# Patient Record
Sex: FEMALE | Race: OTHER | NOT HISPANIC OR LATINO | URBAN - METROPOLITAN AREA
[De-identification: names, ages, dates, MRNs, and addresses within clinical notes are randomized per-mention and may not be internally consistent; named-entity substitution may affect disease eponyms.]

---

## 2018-06-12 ENCOUNTER — EMERGENCY (EMERGENCY)
Facility: HOSPITAL | Age: 28
LOS: 0 days | Discharge: HOME | End: 2018-06-13
Attending: EMERGENCY MEDICINE | Admitting: EMERGENCY MEDICINE
Payer: COMMERCIAL

## 2018-06-12 VITALS
TEMPERATURE: 98 F | HEIGHT: 65 IN | RESPIRATION RATE: 18 BRPM | OXYGEN SATURATION: 100 % | WEIGHT: 179.9 LBS | SYSTOLIC BLOOD PRESSURE: 117 MMHG | HEART RATE: 100 BPM | DIASTOLIC BLOOD PRESSURE: 70 MMHG

## 2018-06-12 PROCEDURE — 29515 APPLICATION SHORT LEG SPLINT: CPT | Mod: LT

## 2018-06-12 PROCEDURE — 73610 X-RAY EXAM OF ANKLE: CPT | Mod: 26,LT

## 2018-06-12 PROCEDURE — 73590 X-RAY EXAM OF LOWER LEG: CPT | Mod: 26,LT

## 2018-06-12 PROCEDURE — 99284 EMERGENCY DEPT VISIT MOD MDM: CPT | Mod: 25

## 2018-06-12 RX ORDER — IBUPROFEN 200 MG
600 TABLET ORAL ONCE
Qty: 0 | Refills: 0 | Status: COMPLETED | OUTPATIENT
Start: 2018-06-12 | End: 2018-06-12

## 2018-06-12 RX ADMIN — Medication 600 MILLIGRAM(S): at 23:14

## 2018-06-12 NOTE — ED ADULT NURSE NOTE - OBJECTIVE STATEMENT
lt ankle pain s/p jump ped and twisted 27 y/o female awake alert and oriented x4 presents to ED c/o left ankle injury from playing basketball. Pt states she jumped and landed with twisted ankle. Pt heard cracking sound. hx of ankle sprains b/l. Denies head injury or any other complaints. (+) pedal pulses, swelling, tenderness.

## 2018-06-12 NOTE — ED PROVIDER NOTE - OBJECTIVE STATEMENT
27 y/o female in ED c/o left ankle pain x 1 hr s/p inversion injury while playing basketball tonight.  no meds taken for pain.  increase pain with ambulation.  pt denies any other complaints
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
## 2018-06-14 DIAGNOSIS — S89.82XA OTHER SPECIFIED INJURIES OF LEFT LOWER LEG, INITIAL ENCOUNTER: ICD-10-CM

## 2018-06-14 DIAGNOSIS — S93.402A SPRAIN OF UNSPECIFIED LIGAMENT OF LEFT ANKLE, INITIAL ENCOUNTER: ICD-10-CM

## 2019-05-04 ENCOUNTER — EMERGENCY (EMERGENCY)
Facility: HOSPITAL | Age: 29
LOS: 0 days | Discharge: HOME | End: 2019-05-04
Attending: FAMILY MEDICINE | Admitting: FAMILY MEDICINE
Payer: MEDICAID

## 2019-05-04 VITALS
HEART RATE: 83 BPM | OXYGEN SATURATION: 100 % | DIASTOLIC BLOOD PRESSURE: 95 MMHG | WEIGHT: 160.06 LBS | TEMPERATURE: 100 F | HEIGHT: 66 IN | RESPIRATION RATE: 24 BRPM | SYSTOLIC BLOOD PRESSURE: 114 MMHG

## 2019-05-04 VITALS
DIASTOLIC BLOOD PRESSURE: 78 MMHG | SYSTOLIC BLOOD PRESSURE: 124 MMHG | OXYGEN SATURATION: 100 % | RESPIRATION RATE: 16 BRPM | TEMPERATURE: 99 F | HEART RATE: 78 BPM
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
## 2019-05-04 DIAGNOSIS — S82.831A OTHER FRACTURE OF UPPER AND LOWER END OF RIGHT FIBULA, INITIAL ENCOUNTER FOR CLOSED FRACTURE: ICD-10-CM
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
## 2019-05-04 LAB
ABO RH CONFIRMATION: SIGNIFICANT CHANGE UP
ALBUMIN SERPL ELPH-MCNC: 3.7 G/DL — SIGNIFICANT CHANGE UP (ref 3.3–5)
ALP SERPL-CCNC: 112 U/L — SIGNIFICANT CHANGE UP (ref 40–120)
ALT FLD-CCNC: 22 U/L — SIGNIFICANT CHANGE UP (ref 12–78)
ANION GAP SERPL CALC-SCNC: 5 MMOL/L — SIGNIFICANT CHANGE UP (ref 5–17)
APTT BLD: 26 SEC — LOW (ref 27.5–36.3)
AST SERPL-CCNC: 16 U/L — SIGNIFICANT CHANGE UP (ref 15–37)
BASOPHILS # BLD AUTO: 0.08 K/UL — SIGNIFICANT CHANGE UP (ref 0–0.2)
BASOPHILS NFR BLD AUTO: 0.7 % — SIGNIFICANT CHANGE UP (ref 0–2)
BILIRUB SERPL-MCNC: 0.2 MG/DL — SIGNIFICANT CHANGE UP (ref 0.2–1.2)
BLD GP AB SCN SERPL QL: SIGNIFICANT CHANGE UP
BUN SERPL-MCNC: 20 MG/DL — SIGNIFICANT CHANGE UP (ref 7–23)
CALCIUM SERPL-MCNC: 8.8 MG/DL — SIGNIFICANT CHANGE UP (ref 8.5–10.1)
CHLORIDE SERPL-SCNC: 107 MMOL/L — SIGNIFICANT CHANGE UP (ref 96–108)
CO2 SERPL-SCNC: 30 MMOL/L — SIGNIFICANT CHANGE UP (ref 22–31)
CREAT SERPL-MCNC: 1.21 MG/DL — SIGNIFICANT CHANGE UP (ref 0.5–1.3)
EOSINOPHIL # BLD AUTO: 0.3 K/UL — SIGNIFICANT CHANGE UP (ref 0–0.5)
EOSINOPHIL NFR BLD AUTO: 2.7 % — SIGNIFICANT CHANGE UP (ref 0–6)
GLUCOSE SERPL-MCNC: 93 MG/DL — SIGNIFICANT CHANGE UP (ref 70–99)
HCT VFR BLD CALC: 40.3 % — SIGNIFICANT CHANGE UP (ref 34.5–45)
HGB BLD-MCNC: 13.5 G/DL — SIGNIFICANT CHANGE UP (ref 11.5–15.5)
IMM GRANULOCYTES NFR BLD AUTO: 0.4 % — SIGNIFICANT CHANGE UP (ref 0–1.5)
INR BLD: 0.97 RATIO — SIGNIFICANT CHANGE UP (ref 0.88–1.16)
LYMPHOCYTES # BLD AUTO: 2.76 K/UL — SIGNIFICANT CHANGE UP (ref 1–3.3)
LYMPHOCYTES # BLD AUTO: 24.5 % — SIGNIFICANT CHANGE UP (ref 13–44)
MCHC RBC-ENTMCNC: 30.5 PG — SIGNIFICANT CHANGE UP (ref 27–34)
MCHC RBC-ENTMCNC: 33.5 GM/DL — SIGNIFICANT CHANGE UP (ref 32–36)
MCV RBC AUTO: 91.2 FL — SIGNIFICANT CHANGE UP (ref 80–100)
MONOCYTES # BLD AUTO: 0.68 K/UL — SIGNIFICANT CHANGE UP (ref 0–0.9)
MONOCYTES NFR BLD AUTO: 6 % — SIGNIFICANT CHANGE UP (ref 2–14)
NEUTROPHILS # BLD AUTO: 7.39 K/UL — SIGNIFICANT CHANGE UP (ref 1.8–7.4)
NEUTROPHILS NFR BLD AUTO: 65.7 % — SIGNIFICANT CHANGE UP (ref 43–77)
NRBC # BLD: 0 /100 WBCS — SIGNIFICANT CHANGE UP (ref 0–0)
PLATELET # BLD AUTO: 297 K/UL — SIGNIFICANT CHANGE UP (ref 150–400)
POTASSIUM SERPL-MCNC: 3.8 MMOL/L — SIGNIFICANT CHANGE UP (ref 3.5–5.3)
POTASSIUM SERPL-SCNC: 3.8 MMOL/L — SIGNIFICANT CHANGE UP (ref 3.5–5.3)
PROT SERPL-MCNC: 7.1 GM/DL — SIGNIFICANT CHANGE UP (ref 6–8.3)
PROTHROM AB SERPL-ACNC: 10.7 SEC — SIGNIFICANT CHANGE UP (ref 10–12.9)
RBC # BLD: 4.42 M/UL — SIGNIFICANT CHANGE UP (ref 3.8–5.2)
RBC # FLD: 12.5 % — SIGNIFICANT CHANGE UP (ref 10.3–14.5)
SODIUM SERPL-SCNC: 142 MMOL/L — SIGNIFICANT CHANGE UP (ref 135–145)
TYPE + AB SCN PNL BLD: SIGNIFICANT CHANGE UP
WBC # BLD: 11.25 K/UL — HIGH (ref 3.8–10.5)
WBC # FLD AUTO: 11.25 K/UL — HIGH (ref 3.8–10.5)

## 2019-05-04 PROCEDURE — 73600 X-RAY EXAM OF ANKLE: CPT | Mod: 26,RT,76

## 2019-05-04 PROCEDURE — 73700 CT LOWER EXTREMITY W/O DYE: CPT | Mod: 26,RT

## 2019-05-04 PROCEDURE — 27788 TREATMENT OF ANKLE FRACTURE: CPT | Mod: 54,RT

## 2019-05-04 PROCEDURE — 76376 3D RENDER W/INTRP POSTPROCES: CPT | Mod: 26

## 2019-05-04 PROCEDURE — 99285 EMERGENCY DEPT VISIT HI MDM: CPT

## 2019-05-04 PROCEDURE — 73590 X-RAY EXAM OF LOWER LEG: CPT | Mod: 26,RT

## 2019-05-04 PROCEDURE — 73630 X-RAY EXAM OF FOOT: CPT | Mod: 26,RT

## 2019-05-04 RX ORDER — IBUPROFEN 200 MG
1 TABLET ORAL
Qty: 30 | Refills: 0
Start: 2019-05-04 | End: 2019-05-13

## 2019-05-04 RX ORDER — HYDROMORPHONE HYDROCHLORIDE 2 MG/ML
1 INJECTION INTRAMUSCULAR; INTRAVENOUS; SUBCUTANEOUS ONCE
Qty: 0 | Refills: 0 | Status: DISCONTINUED | OUTPATIENT
Start: 2019-05-04 | End: 2019-05-04

## 2019-05-04 RX ORDER — SODIUM CHLORIDE 9 MG/ML
1000 INJECTION INTRAMUSCULAR; INTRAVENOUS; SUBCUTANEOUS ONCE
Qty: 0 | Refills: 0 | Status: COMPLETED | OUTPATIENT
Start: 2019-05-04 | End: 2019-05-04

## 2019-05-04 RX ADMIN — HYDROMORPHONE HYDROCHLORIDE 1 MILLIGRAM(S): 2 INJECTION INTRAMUSCULAR; INTRAVENOUS; SUBCUTANEOUS at 17:39

## 2019-05-04 RX ADMIN — SODIUM CHLORIDE 1000 MILLILITER(S): 9 INJECTION INTRAMUSCULAR; INTRAVENOUS; SUBCUTANEOUS at 17:31

## 2019-05-04 RX ADMIN — HYDROMORPHONE HYDROCHLORIDE 1 MILLIGRAM(S): 2 INJECTION INTRAMUSCULAR; INTRAVENOUS; SUBCUTANEOUS at 17:06

## 2019-05-04 NOTE — ED ADULT NURSE NOTE - NSIMPLEMENTINTERV_GEN_ALL_ED
Implemented All Fall Risk Interventions:  [place] to call system. Call bell, personal items and telephone within reach. Instruct patient to call for assistance. Room bathroom lighting operational. Non-slip footwear when patient is off stretcher. Physically safe environment: no spills, clutter or unnecessary equipment. Stretcher in lowest position, wheels locked, appropriate side rails in place. Provide visual cue, wrist band, yellow gown, etc. Monitor gait and stability. Monitor for mental status changes and reorient to person, place, and time. Review medications for side effects contributing to fall risk. Reinforce activity limits and safety measures with patient and family.

## 2019-05-04 NOTE — ED PROVIDER NOTE - PROGRESS NOTE DETAILS
Kai CAMPBELL for attending Dr. Cade: spoke with Dr. Austin of orthopedics regarding pt's condition. Scribe AD for attending Dr. Cade: call placed to Dr. Austin to ask regarding dispo. States Dr. Mercedes will be taking over the case and will give him a call.

## 2019-05-04 NOTE — ED PROVIDER NOTE - CARE PLAN
Principal Discharge DX:	Ankle dislocation, right, initial encounter  Secondary Diagnosis:	Ankle fracture

## 2019-05-04 NOTE — ED ADULT NURSE NOTE - OBJECTIVE STATEMENT
Pt was on a skateboard fell off and landed on right ankle, obvious deformity. Pt has positive pedal pulse, warm to touch with < 2 capillary refill.

## 2019-05-04 NOTE — CONSULT NOTE ADULT - SUBJECTIVE AND OBJECTIVE BOX
28y Female community ambulatory presents c/o R ankle pain sp mechanical fall while longboarding. Denies HS/LOC. Denies numbness/tingling. No other pain/injuries. Denies fevers/chills.     HEALTH ISSUES - PROBLEM Dx:  depression  asthma      MEDICATIONS  (STANDING):    Allergies    No Known Drug Allergies  Originally Entered as [Mild Rash/hives] reaction to [trix cereal] (Unknown)    Intolerances                              13.5   11.25 )-----------( 297      ( 04 May 2019 17:32 )             40.3     04 May 2019 17:32    142    |  107    |  20     ----------------------------<  93     3.8     |  30     |  1.21     Ca    8.8        04 May 2019 17:32    TPro  7.1    /  Alb  3.7    /  TBili  0.2    /  DBili  x      /  AST  16     /  ALT  22     /  AlkPhos  112    04 May 2019 17:32    PT/INR - ( 04 May 2019 17:32 )   PT: 10.7 sec;   INR: 0.97 ratio         PTT - ( 04 May 2019 17:32 )  PTT:26.0 sec  Vital Signs Last 24 Hrs  T(C): 37.7 (05-04-19 @ 16:46), Max: 37.7 (05-04-19 @ 16:46)  T(F): 99.8 (05-04-19 @ 16:46), Max: 99.8 (05-04-19 @ 16:46)  HR: 76 (05-04-19 @ 17:38) (76 - 83)  BP: 138/77 (05-04-19 @ 17:38) (114/95 - 138/77)  BP(mean): --  RR: 20 (05-04-19 @ 17:38) (20 - 24)  SpO2: 99% (05-04-19 @ 17:38) (99% - 100%)    Imaging: XR demonstrates R ankle fracture/dislocation, CT RLE obtained    Physical Exam  Gen: Nad  R LE: Skin intact, +deformity ankle, +TTP medial malleolus and lateral fibula, no bony ttp elsewhere, +ehl/fhl/ta/gs function, dp/pt pulse intact, negative log roll, able to SLR, compartments soft/compressive, extremity warm/well perfused  Secondary Survey: Benign, no bony ttp elsewhere, NV intact    Procedure: 10 cc 1% lidocaine injected under sterile procedure into  R ankle joint. Closed reduction performed. Placed in well padded trilam splint. Post procedure imaging obtained. Post procedure exam unchanged, NV intact, able to move all toes, SILT distally.     A/P: 28y Female with R ankle fracture/dislocation s/p reduction    Pain control  NWB R LE in splint, keep c/d/I, cane/crutches/walker as needed  Ice/elevation  CT RLE reviewed  Si/sx compartment syndrome discussed with patient, told to return to ED if exhibit any  Need for surgical intervention in future discussed, all questions answered  Follow up with Dr. Mercedes on 5/6/19, call office for appointment   Ortho stable for DC

## 2019-05-04 NOTE — ED ADULT TRIAGE NOTE - CHIEF COMPLAINT QUOTE
Pt presents to ED s/p fall on skateboard obtaining obvious right ankle deformity.  Denies hitting head, LOC or blood thinners.  100 micrograms fentanyl given PTA along with 4mg Zofran.

## 2019-05-04 NOTE — ED PROVIDER NOTE - OBJECTIVE STATEMENT
29 y/o female with a PMHx of depression on Wellbutrin and Trintellix presents to the ED BIBA s/p fall off skateboard c/o right ankle pain with obvious deformity. Pt reports she fell and heard a crack as she landed on her right ankle. Denies head injury, LOC, fever. Not on any blood thinners. Pt was given 100 micrograms fentanyl given PTA along with 4mg Zofran. Pt drank water PTA and last ate a burger around 11:30AM today with some snacks in the afternoon.

## 2019-05-04 NOTE — ED PROVIDER NOTE - CLINICAL SUMMARY MEDICAL DECISION MAKING FREE TEXT BOX
Pt s/p fall off skateboard wit right ankle deformity. Will give Dilaudid for pain, X-Ray's of right ankle, labs, consult ortho.

## 2019-05-04 NOTE — ED PROVIDER NOTE - MUSCULOSKELETAL, MLM
Spine appears normal, range of motion is not limited, +right ankle deformed and tenting, good pulses and sensation intact

## 2019-05-06 ENCOUNTER — APPOINTMENT (OUTPATIENT)
Dept: ORTHOPEDIC SURGERY | Facility: CLINIC | Age: 29
End: 2019-05-06
Payer: MEDICAID

## 2019-05-06 VITALS
HEART RATE: 91 BPM | HEIGHT: 65 IN | BODY MASS INDEX: 36.65 KG/M2 | SYSTOLIC BLOOD PRESSURE: 102 MMHG | WEIGHT: 220 LBS | DIASTOLIC BLOOD PRESSURE: 67 MMHG

## 2019-05-06 DIAGNOSIS — Z78.9 OTHER SPECIFIED HEALTH STATUS: ICD-10-CM

## 2019-05-06 DIAGNOSIS — Z87.09 PERSONAL HISTORY OF OTHER DISEASES OF THE RESPIRATORY SYSTEM: ICD-10-CM

## 2019-05-06 DIAGNOSIS — Z82.61 FAMILY HISTORY OF ARTHRITIS: ICD-10-CM

## 2019-05-06 PROBLEM — F32.9 MAJOR DEPRESSIVE DISORDER, SINGLE EPISODE, UNSPECIFIED: Chronic | Status: ACTIVE | Noted: 2019-05-04

## 2019-05-06 PROCEDURE — 99204 OFFICE O/P NEW MOD 45 MIN: CPT

## 2019-05-06 RX ORDER — BUPROPION HYDROCHLORIDE 150 MG/1
150 TABLET, EXTENDED RELEASE ORAL
Qty: 30 | Refills: 0 | Status: ACTIVE | COMMUNITY
Start: 2019-02-19

## 2019-05-06 RX ORDER — VORTIOXETINE 10 MG/1
10 TABLET, FILM COATED ORAL
Qty: 30 | Refills: 0 | Status: ACTIVE | COMMUNITY
Start: 2019-03-30

## 2019-05-06 NOTE — CONSULT LETTER
[Consult Letter:] : I had the pleasure of evaluating your patient, [unfilled]. [Please see my note below.] : Please see my note below. [Consult Closing:] : Thank you very much for allowing me to participate in the care of this patient.  If you have any questions, please do not hesitate to contact me. [Sincerely,] : Sincerely, [FreeTextEntry3] : Raffy Mercedes

## 2019-05-06 NOTE — DISCUSSION/SUMMARY
[de-identified] : Today I had a lengthy discussion with the patient regarding their right fibula pain.I have addressed all the patient's concerns surrounding the pathology of their condition. A discussion was had about surgery. A lengthy discussion was had about the surgery. All risks, benefits and alternatives to the recommended surgical procedure were discussed which include but are not limited to bleeding, infection, nerve damage, vascular damage, failure of the wound to heal, the need for further surgery, loss of limb, DVT, PE, loss of life as well as the risks associated with general anesthesia. The patient verbalized understanding and provided informed consent to move forward with surgery. I informed the patient that she would have to be non weight bearing following the surgery. I also informed the patient about PO care and rehabilitation. The patient understood and verbally agreed to the treatment plan. All of their questions were answered and they were satisfied with the visit. The patient should call the office if they have any questions or experience worsening symptoms. \par

## 2019-05-06 NOTE — HISTORY OF PRESENT ILLNESS
[FreeTextEntry1] : 28 year year old female presenting with right fibula pain. The patient’s pain is noted to be a 5/10. The patient's pain began on 5/4/19 when she had a fall while riding her long board. She states that she heard multiple cracks during the fall. The patient went to the ER after the fall where she was told she had a dislocation and a fracture. She was put into a splint at the ER. The patient's pain is described as throbbing and constant in nature. She is currently taking NSAIDs. The patient presents today in a Splint. The patient is accompanied by her mother and father. No other complaints at this time.

## 2019-05-06 NOTE — PHYSICAL EXAM
[de-identified] : General: Alert and oriented x3. In no acute distress. Pleasant in nature with a normal affect. No apparent respiratory distress.\par \par R Ankle Exam \par Limited Exam. Splint in Place. \par Skin:Could not assess\par Inspection: Could not assess. \par Pulses: 2+ DP/PT pulses\par ROM: RIGHT Could not assess degrees of dorsiflexion, Could not assess degrees of plantarflexion, Could not assess degrees of subtalar motion\par Tenderness: Could not assess.\par Strength: Could not assess\par Neuro: In tact to light touch throughout\par  [de-identified] : CT Scan from [facility] of RLE of 5/4/19 and reviewed by me today, 5/6/19, revealed: \par \par Acute obliquely oriented fracture of the distal third of the \par fibular diaphysis with posterior displacement of the dominant distal \par fracture fragment.

## 2019-05-06 NOTE — ADDENDUM
[FreeTextEntry1] : I, Xander Dee, acted solely as a scribe for Dr. Raffy Mercedes on this date 05/06/2019  .\par  \par All medical record entries made by the Scribe were at my, Dr. Raffy Mercedes, direction and personally dictated by me on 05/06/2019 . I have reviewed the chart and agree that the record accurately reflects my personal performance of the history, physical exam, assessment and plan. I have also personally directed, reviewed, and agreed with the chart.\par \par \par

## 2019-05-23 ENCOUNTER — APPOINTMENT (OUTPATIENT)
Dept: ORTHOPEDIC SURGERY | Facility: HOSPITAL | Age: 29
End: 2019-05-23

## 2019-05-23 ENCOUNTER — INPATIENT (INPATIENT)
Facility: HOSPITAL | Age: 29
LOS: 0 days | Discharge: HOME | End: 2019-05-23
Attending: ORTHOPAEDIC SURGERY | Admitting: ORTHOPAEDIC SURGERY
Payer: MEDICAID

## 2019-05-23 VITALS
HEART RATE: 80 BPM | DIASTOLIC BLOOD PRESSURE: 53 MMHG | OXYGEN SATURATION: 98 % | SYSTOLIC BLOOD PRESSURE: 121 MMHG | RESPIRATION RATE: 14 BRPM

## 2019-05-23 VITALS — HEIGHT: 65 IN | WEIGHT: 220.02 LBS

## 2019-05-23 DIAGNOSIS — S82.891D OTHER FRACTURE OF RIGHT LOWER LEG, SUBSEQUENT ENCOUNTER FOR CLOSED FRACTURE WITH ROUTINE HEALING: ICD-10-CM

## 2019-05-23 LAB
ALBUMIN SERPL ELPH-MCNC: 3.5 G/DL — SIGNIFICANT CHANGE UP (ref 3.3–5)
ALP SERPL-CCNC: 100 U/L — SIGNIFICANT CHANGE UP (ref 40–120)
ALT FLD-CCNC: 20 U/L — SIGNIFICANT CHANGE UP (ref 12–78)
ANION GAP SERPL CALC-SCNC: 6 MMOL/L — SIGNIFICANT CHANGE UP (ref 5–17)
APTT BLD: 30.5 SEC — SIGNIFICANT CHANGE UP (ref 27.5–36.3)
AST SERPL-CCNC: 14 U/L — LOW (ref 15–37)
BASOPHILS # BLD AUTO: 0.05 K/UL — SIGNIFICANT CHANGE UP (ref 0–0.2)
BASOPHILS NFR BLD AUTO: 0.8 % — SIGNIFICANT CHANGE UP (ref 0–2)
BILIRUB SERPL-MCNC: 0.5 MG/DL — SIGNIFICANT CHANGE UP (ref 0.2–1.2)
BLD GP AB SCN SERPL QL: SIGNIFICANT CHANGE UP
BUN SERPL-MCNC: 15 MG/DL — SIGNIFICANT CHANGE UP (ref 7–23)
CALCIUM SERPL-MCNC: 9 MG/DL — SIGNIFICANT CHANGE UP (ref 8.5–10.1)
CHLORIDE SERPL-SCNC: 107 MMOL/L — SIGNIFICANT CHANGE UP (ref 96–108)
CO2 SERPL-SCNC: 26 MMOL/L — SIGNIFICANT CHANGE UP (ref 22–31)
CREAT SERPL-MCNC: 0.92 MG/DL — SIGNIFICANT CHANGE UP (ref 0.5–1.3)
EOSINOPHIL # BLD AUTO: 0.27 K/UL — SIGNIFICANT CHANGE UP (ref 0–0.5)
EOSINOPHIL NFR BLD AUTO: 4.1 % — SIGNIFICANT CHANGE UP (ref 0–6)
GLUCOSE SERPL-MCNC: 82 MG/DL — SIGNIFICANT CHANGE UP (ref 70–99)
HCG SERPL-ACNC: <1 MIU/ML — SIGNIFICANT CHANGE UP
HCT VFR BLD CALC: 40 % — SIGNIFICANT CHANGE UP (ref 34.5–45)
HGB BLD-MCNC: 13.3 G/DL — SIGNIFICANT CHANGE UP (ref 11.5–15.5)
IMM GRANULOCYTES NFR BLD AUTO: 0.2 % — SIGNIFICANT CHANGE UP (ref 0–1.5)
INR BLD: 1.04 RATIO — SIGNIFICANT CHANGE UP (ref 0.88–1.16)
LYMPHOCYTES # BLD AUTO: 2.19 K/UL — SIGNIFICANT CHANGE UP (ref 1–3.3)
LYMPHOCYTES # BLD AUTO: 33.4 % — SIGNIFICANT CHANGE UP (ref 13–44)
MCHC RBC-ENTMCNC: 29.8 PG — SIGNIFICANT CHANGE UP (ref 27–34)
MCHC RBC-ENTMCNC: 33.3 GM/DL — SIGNIFICANT CHANGE UP (ref 32–36)
MCV RBC AUTO: 89.7 FL — SIGNIFICANT CHANGE UP (ref 80–100)
MONOCYTES # BLD AUTO: 0.43 K/UL — SIGNIFICANT CHANGE UP (ref 0–0.9)
MONOCYTES NFR BLD AUTO: 6.6 % — SIGNIFICANT CHANGE UP (ref 2–14)
NEUTROPHILS # BLD AUTO: 3.61 K/UL — SIGNIFICANT CHANGE UP (ref 1.8–7.4)
NEUTROPHILS NFR BLD AUTO: 54.9 % — SIGNIFICANT CHANGE UP (ref 43–77)
PLATELET # BLD AUTO: 279 K/UL — SIGNIFICANT CHANGE UP (ref 150–400)
POTASSIUM SERPL-MCNC: 3.8 MMOL/L — SIGNIFICANT CHANGE UP (ref 3.5–5.3)
POTASSIUM SERPL-SCNC: 3.8 MMOL/L — SIGNIFICANT CHANGE UP (ref 3.5–5.3)
PROT SERPL-MCNC: 7.1 GM/DL — SIGNIFICANT CHANGE UP (ref 6–8.3)
PROTHROM AB SERPL-ACNC: 11.6 SEC — SIGNIFICANT CHANGE UP (ref 10–12.9)
RBC # BLD: 4.46 M/UL — SIGNIFICANT CHANGE UP (ref 3.8–5.2)
RBC # FLD: 12.2 % — SIGNIFICANT CHANGE UP (ref 10.3–14.5)
SODIUM SERPL-SCNC: 139 MMOL/L — SIGNIFICANT CHANGE UP (ref 135–145)
TYPE + AB SCN PNL BLD: SIGNIFICANT CHANGE UP
WBC # BLD: 6.56 K/UL — SIGNIFICANT CHANGE UP (ref 3.8–10.5)
WBC # FLD AUTO: 6.56 K/UL — SIGNIFICANT CHANGE UP (ref 3.8–10.5)

## 2019-05-23 PROCEDURE — 73610 X-RAY EXAM OF ANKLE: CPT | Mod: 26,RT

## 2019-05-23 PROCEDURE — 99223 1ST HOSP IP/OBS HIGH 75: CPT | Mod: 24,57

## 2019-05-23 PROCEDURE — 27792 TREATMENT OF ANKLE FRACTURE: CPT | Mod: 58,RT

## 2019-05-23 PROCEDURE — 99221 1ST HOSP IP/OBS SF/LOW 40: CPT

## 2019-05-23 PROCEDURE — 27829 TREAT LOWER LEG JOINT: CPT | Mod: 58,RT

## 2019-05-23 PROCEDURE — 93010 ELECTROCARDIOGRAM REPORT: CPT

## 2019-05-23 PROCEDURE — 76000 FLUOROSCOPY <1 HR PHYS/QHP: CPT | Mod: 26

## 2019-05-23 PROCEDURE — 99285 EMERGENCY DEPT VISIT HI MDM: CPT

## 2019-05-23 RX ORDER — OXYCODONE HYDROCHLORIDE 5 MG/1
10 TABLET ORAL EVERY 4 HOURS
Refills: 0 | Status: DISCONTINUED | OUTPATIENT
Start: 2019-05-23 | End: 2019-05-23

## 2019-05-23 RX ORDER — OXYCODONE HYDROCHLORIDE 5 MG/1
1 TABLET ORAL
Qty: 40 | Refills: 0
Start: 2019-05-23

## 2019-05-23 RX ORDER — SODIUM CHLORIDE 9 MG/ML
1000 INJECTION, SOLUTION INTRAVENOUS
Refills: 0 | Status: DISCONTINUED | OUTPATIENT
Start: 2019-05-23 | End: 2019-05-23

## 2019-05-23 RX ORDER — OXYCODONE HYDROCHLORIDE 5 MG/1
5 TABLET ORAL EVERY 4 HOURS
Refills: 0 | Status: DISCONTINUED | OUTPATIENT
Start: 2019-05-23 | End: 2019-05-23

## 2019-05-23 RX ORDER — ASCORBIC ACID 60 MG
500 TABLET,CHEWABLE ORAL
Refills: 0 | Status: DISCONTINUED | OUTPATIENT
Start: 2019-05-23 | End: 2019-05-23

## 2019-05-23 RX ORDER — FOLIC ACID 0.8 MG
1 TABLET ORAL DAILY
Refills: 0 | Status: DISCONTINUED | OUTPATIENT
Start: 2019-05-23 | End: 2019-05-23

## 2019-05-23 RX ORDER — ONDANSETRON 8 MG/1
1 TABLET, FILM COATED ORAL
Qty: 20 | Refills: 0
Start: 2019-05-23

## 2019-05-23 RX ORDER — FENTANYL CITRATE 50 UG/ML
50 INJECTION INTRAVENOUS
Refills: 0 | Status: DISCONTINUED | OUTPATIENT
Start: 2019-05-23 | End: 2019-05-23

## 2019-05-23 RX ORDER — OXYCODONE HYDROCHLORIDE 5 MG/1
10 TABLET ORAL ONCE
Refills: 0 | Status: DISCONTINUED | OUTPATIENT
Start: 2019-05-23 | End: 2019-05-23

## 2019-05-23 RX ORDER — ONDANSETRON 8 MG/1
4 TABLET, FILM COATED ORAL ONCE
Refills: 0 | Status: DISCONTINUED | OUTPATIENT
Start: 2019-05-23 | End: 2019-05-23

## 2019-05-23 RX ORDER — ACETAMINOPHEN 500 MG
650 TABLET ORAL EVERY 6 HOURS
Refills: 0 | Status: DISCONTINUED | OUTPATIENT
Start: 2019-05-23 | End: 2019-05-23

## 2019-05-23 RX ORDER — DOCUSATE SODIUM 100 MG
1 CAPSULE ORAL
Qty: 20 | Refills: 0
Start: 2019-05-23

## 2019-05-23 RX ORDER — ASPIRIN/CALCIUM CARB/MAGNESIUM 324 MG
1 TABLET ORAL
Qty: 30 | Refills: 0
Start: 2019-05-23 | End: 2019-06-21

## 2019-05-23 RX ORDER — DOCUSATE SODIUM 100 MG
100 CAPSULE ORAL THREE TIMES A DAY
Refills: 0 | Status: DISCONTINUED | OUTPATIENT
Start: 2019-05-23 | End: 2019-05-23

## 2019-05-23 RX ADMIN — OXYCODONE HYDROCHLORIDE 10 MILLIGRAM(S): 5 TABLET ORAL at 18:29

## 2019-05-23 RX ADMIN — OXYCODONE HYDROCHLORIDE 10 MILLIGRAM(S): 5 TABLET ORAL at 18:24

## 2019-05-23 RX ADMIN — SODIUM CHLORIDE 75 MILLILITER(S): 9 INJECTION, SOLUTION INTRAVENOUS at 09:05

## 2019-05-23 RX ADMIN — SODIUM CHLORIDE 76 MILLILITER(S): 9 INJECTION, SOLUTION INTRAVENOUS at 18:29

## 2019-05-23 RX ADMIN — FENTANYL CITRATE 50 MICROGRAM(S): 50 INJECTION INTRAVENOUS at 18:24

## 2019-05-23 RX ADMIN — FENTANYL CITRATE 50 MICROGRAM(S): 50 INJECTION INTRAVENOUS at 18:29

## 2019-05-23 NOTE — ED ADULT NURSE NOTE - NSIMPLEMENTINTERV_GEN_ALL_ED
Implemented All Fall Risk Interventions:  [place] to call system. Call bell, personal items and telephone within reach. Instruct patient to call for assistance. Room bathroom lighting operational. Non-slip footwear when patient is off stretcher. Physically safe environment: no spills, clutter or unnecessary equipment. Stretcher in lowest position, wheels locked, appropriate side rails in place. Provide visual cue, wrist band, yellow gown, etc. Monitor gait and stability. Monitor for mental status changes and reorient to person, place, and time. Review medications for side effects contributing to fall risk. Reinforce activity limits and safety measures with patient and family.

## 2019-05-23 NOTE — ED ADULT TRIAGE NOTE - CHIEF COMPLAINT QUOTE
pt has Right ankle and fibula fracture since 5/4/19,leg is in a cast , c/o pain to right leg , denies new injury  , meeting Dr. Mercedes

## 2019-05-23 NOTE — BRIEF OPERATIVE NOTE - NSICDXBRIEFPROCEDURE_GEN_ALL_CORE_FT
PROCEDURES:  Open reduction and internal fixation (ORIF) of fracture of lateral malleolus of right fibula 23-May-2019 17:42:55  Radha Espinosa

## 2019-05-23 NOTE — ASU DISCHARGE PLAN (ADULT/PEDIATRIC) - CARE PROVIDER_API CALL
Raffy Mercedes (DO)  Orthopaedic Surgery  [address] [place] [address]  Phone: [phone]  Fax: [phone]  Follow Up Time:

## 2019-05-23 NOTE — ED PROVIDER NOTE - MUSCULOSKELETAL, MLM
Spine appears normal, range of motion is not limited, no muscle or joint tenderness; right ankle in splint;

## 2019-05-23 NOTE — H&P ADULT - ASSESSMENT
Assessment:  29y Female with increased right ankle pain 2' right ankle fracture.    Plan:  Admit to Orthopedics.  Pt advised that surgical fixation of right ankle fracture is necessary. Surgical procedure was discussed in detail including all risks, benefits and alternatives. Pt expresses understanding and agrees to proceed with surgery; consent obtained.  Plan for OR today.  NPO and hold chemical anticoagulation pending OR.  NWB RLE with trilam splint.  Pain control.  Ice application.  RLE elevation.   f/u imaging.    Case discussed with Dr. Mercedes who agrees with plan.

## 2019-05-23 NOTE — ED ADULT NURSE NOTE - OBJECTIVE STATEMENT
patient presents with complaint of right leg pain s/p fx of right ankle and fibula on 5/4/19. right leg currently splinted. + sensation and movement in toes. good cap refill noted. patient reports constant pain. patient last took pain medicine - IBU - 3 days ago. patient currently ambulating at home with crutches - non-weight bearing on right leg. color good. skin warm and dry. respirations even and unlabored. family at bedside.

## 2019-05-23 NOTE — H&P ADULT - ATTENDING COMMENTS
Patient seen and examined at bedside. The patient is well known to me from the office. She sustained a right distal third fibula fracture with syndesmotic disruption. The patient was indicated for surgery.All risks, benefits and alternatives to the recommended surgical procedure were discussed which include but are not limited to bleeding, infection, nerve damage, vascular damage, failure of the wound to heal, the need for further surgery, loss of limb, DVT, PE, loss of life as well as the risks associated with general anesthesia. The patient verbalized understanding and provided informed consent to move forward with surgery.All questions were answered and the patient verbalized understanding.  The patient is in agreement with this treatment plan.    Patient agreed to move forward with right ankle open reduction internal fixation with syndesmotic fixation.

## 2019-05-23 NOTE — H&P ADULT - NSHPLABSRESULTS_GEN_ALL_CORE
Labs:                        13.3   6.56  )-----------( 279      ( 23 May 2019 09:01 )             40.0   05-23    139  |  107  |  15  ----------------------------<  82  3.8   |  26  |  0.92    Ca    9.0      23 May 2019 09:01    TPro  7.1  /  Alb  3.5  /  TBili  0.5  /  DBili  x   /  AST  14<L>  /  ALT  20  /  AlkPhos  100  05-23    PT/INR - ( 23 May 2019 09:01 )   PT: 11.6 sec;   INR: 1.04 ratio         PTT - ( 23 May 2019 09:01 )  PTT:30.5 sec    Upreg neg    Imaging:  Right ankle XRs and CT from 5/4 demonstrate a right tyshawn ankle fracture/dislocation s/p closed reduction.  Repeat right ankle XR pending.

## 2019-05-23 NOTE — H&P ADULT - HISTORY OF PRESENT ILLNESS
Orthopedics    Dx: Right Ankle Fracture  Attending: Dr. Mercedes    CC: Right Ankle Pain    PAST MEDICAL & SURGICAL HISTORY:  Depression  29y    HPI: Pt is a 29y Female who presents to the ED c/o increased right ankle pain s/p right ankle fracture/dislocation on 5/4 being followed by Dr. Mercedes. Pt reports for the past day or so. Pt denies any new injury or trauma. Pt denies any numbness, tingling or paresthesias. Pt denies any fever or chills. Orthopedics    Dx: Right Ankle Fracture  Attending: Dr. Mercedes    CC: Right Ankle Pain    PAST MEDICAL & SURGICAL HISTORY:  Depression  29y    HPI: Pt is a 29y Female who presents to the ED c/o increased right ankle pain s/p right ankle fracture/dislocation on 5/4 being followed by Dr. Mercedes. Pt reports some increased pain for the past day or so. Pt denies any new injury or trauma. Pt denies any numbness, tingling or paresthesias. Pt denies any fever or chills.

## 2019-05-23 NOTE — H&P ADULT - NSHPPHYSICALEXAM_GEN_ALL_CORE
Exam:  NAD AAOx3 Well nourished  Head: NC AT, PEERL  Neck: FAROM supple  Pulse: Regular  Lungs: Breathing nonlabored  Abdomen: Soft NT  LE: No edema  Musculoskeletal:  PE right ankle:  Trilam splint in place, clean, dry and intact. Moving all toes, +EHL/FHL. SILT distally. Cap refill <2secs.

## 2019-05-29 DIAGNOSIS — S82.61XD DISPLACED FRACTURE OF LATERAL MALLEOLUS OF RIGHT FIBULA, SUBSEQUENT ENCOUNTER FOR CLOSED FRACTURE WITH ROUTINE HEALING: ICD-10-CM
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
## 2019-06-03 ENCOUNTER — APPOINTMENT (OUTPATIENT)
Dept: ORTHOPEDIC SURGERY | Facility: CLINIC | Age: 29
End: 2019-06-03
Payer: MEDICAID

## 2019-06-03 PROCEDURE — 29405 APPL SHORT LEG CAST: CPT | Mod: 58,RT

## 2019-06-03 PROCEDURE — 99024 POSTOP FOLLOW-UP VISIT: CPT

## 2019-06-03 PROCEDURE — 73610 X-RAY EXAM OF ANKLE: CPT | Mod: RT

## 2019-06-03 NOTE — HISTORY OF PRESENT ILLNESS
[___ Days Post Op] : post op day #[unfilled] [Healed] : healed [Clean/Dry/Intact] : clean, dry and intact [Neuro Intact] : an unremarkable neurological exam [Vascular Intact] : ~T peripheral vascular exam normal [Negative Marco's] : maneuvers demonstrated a negative Marco's sign [Doing Well] : is doing well [Excellent Pain Control] : has excellent pain control [Sutures Removed] : sutures were removed [No Sign of Infection] : is showing no signs of infection [Chills] : no chills [Fever] : no fever [Nausea] : no nausea [Erythema] : not erythematous [Vomiting] : no vomiting [Discharge] : absent of discharge [Swelling] : not swollen [Dehiscence] : not dehisced [de-identified] : Right ankle sx\par DOS 5/23/19 [de-identified] : 29 year year old female presenting 11 days post-op from right ankle sx.  The patient’s pain is noted to be a 4/10. The patient describes their pain as 80% improved compared to their last visit. She describes her swelling as 90% improved compared to the last visit. She  is currently taking Advil and Aspirin. She presents wearing a cast. She presents ambulating with crutches. The patient is ambulating with a wheel chair. The patient is accompanied by her father. No other complaints at this time. \par   [de-identified] : General: Alert and oriented x3. In no acute distress. Pleasant in nature with a normal affect. No apparent respiratory distress.\par  \par The wound is intact. no evidence of any diastases or dehiscence. No surrounding erythema noted. No fluctuance. The area is warm and well perfused. The patient is able to wiggle their toes. Neurovascularly intact. No medial sided pain. \par \par \par  \par   [de-identified] : 3V of the R ankle were ordered obtained and reviewed by me today, 06/03/2019 , revealed:\par Hardware in good position. Healing noted.  [de-identified] : 29 year year old female present 11 days post-op from right ankle sx. I recommend that the patient be fitted for a cast. The patient was fitted for the cast in the office today. She should be non weight bearing until further notice. If the patient notices any loosening of the cast, they should call the office as soon as possible.I advised the patient to utilize ice and elevate their RLE.  At this time, the patient should continue their aspirin regimen. The patient can utilize NSAIDs PRN. I would like to see the patient back in the office in 2-4 weeks to reassess their condition. The patient understood and verbally agreed to the treatment plan. All of their questions were answered and they were satisfied with the visit. The patient should call the office if they have any questions or experience worsening symptoms. \par

## 2019-06-03 NOTE — ADDENDUM
[FreeTextEntry1] : I, Xander Dee, acted solely as a scribe for Dr. Raffy Mercedes on this date 06/03/2019  .\par  \par All medical record entries made by the Scribe were at my, Dr. Raffy Mrecedes, direction and personally dictated by me on 06/03/2019 . I have reviewed the chart and agree that the record accurately reflects my personal performance of the history, physical exam, assessment and plan. I have also personally directed, reviewed, and agreed with the chart.\par \par \par

## 2019-06-17 ENCOUNTER — APPOINTMENT (OUTPATIENT)
Dept: ORTHOPEDIC SURGERY | Facility: CLINIC | Age: 29
End: 2019-06-17
Payer: MEDICAID

## 2019-06-17 PROCEDURE — 97760 ORTHOTIC MGMT&TRAING 1ST ENC: CPT

## 2019-06-17 PROCEDURE — 99024 POSTOP FOLLOW-UP VISIT: CPT

## 2019-06-17 PROCEDURE — 73610 X-RAY EXAM OF ANKLE: CPT | Mod: RT

## 2019-06-17 NOTE — ADDENDUM
[FreeTextEntry1] : I, Xander Dee, acted solely as a scribe for Dr. Raffy Mercedes on this date 06/17/2019  .\par  \par All medical record entries made by the Scribe were at my, Dr. Raffy Mercedes, direction and personally dictated by me on 06/17/2019 . I have reviewed the chart and agree that the record accurately reflects my personal performance of the history, physical exam, assessment and plan. I have also personally directed, reviewed, and agreed with the chart.\par \par \par

## 2019-06-17 NOTE — HISTORY OF PRESENT ILLNESS
[___ Weeks Post Op] : [unfilled] weeks post op [0] : no pain reported [Clean/Dry/Intact] : clean, dry and intact [Healed] : healed [Vascular Intact] : ~T peripheral vascular exam normal [Neuro Intact] : an unremarkable neurological exam [Doing Well] : is doing well [Excellent Pain Control] : has excellent pain control [Negative Marco's] : maneuvers demonstrated a negative Marco's sign [No Sign of Infection] : is showing no signs of infection [No Obvious Fractures] : no obvious fractures [Hardware in Good Position] : hardware in good position [Good Overall Alignment] : good overall alignment [Fixation Site Stable] : fixation site appears stable [Fever] : no fever [Chills] : no chills [Vomiting] : no vomiting [Erythema] : not erythematous [Nausea] : no nausea [Discharge] : absent of discharge [Swelling] : not swollen [Dehiscence] : not dehisced [de-identified] : Right ankle sx\par DOS 5/23/19.  [de-identified] : 29 year year old female presenting 3.5 weeks post-op from Right ankle sx.  The patient’s pain is noted to be a 0/10. The patient describes their pain and swelling as 100% improved compared to the last visit. She presents ambulating with crutches. She presents wearing a cast. The patient is accompanied by her father. She  is currently taking Aspirin. No other complaints at this time. \par   [de-identified] : 3V of the R ankle were ordered obtained and reviewed by me today, 06/17/2019 , revealed:\par Hardware in good position. Healing noted. Ankle is stable. \par \par \par \par   [de-identified] : Patient is a 29 year year old female present in the office today 3.5 weeks s/p Right ankle sx. The patient should be non weight bearing at this time. At this time, the patient should continue their aspirin regimen. I recommend that the patient utilize a CAM boot. The patient was fitted for the CAM boot in the office today. The CAM boot should be treated like a cast and the patient should be non weight bearing. I advised the patient to utilize ice with the CAM boot.  I would like to see the patient back in the office in 2 weeks to reassess their condition. The patient understood and verbally agreed to the treatment plan. All of their questions were answered and they were satisfied with the visit. The patient should call the office if they have any questions or experience worsening symptoms.\par   [de-identified] : General: Alert and oriented x3. In no acute distress. Pleasant in nature with a normal affect. No apparent respiratory distress.\par \par R Ankle  \par Cast removed for exam. \par No calf pain. +Slight tenderness at the incision. The wound is intact. no evidence of any diastases or dehiscence. No surrounding erythema noted. No fluctuance. The area is warm and well perfused. The patient is able to wiggle their toes. Neurovascularly intact.\par \par \par

## 2019-07-03 ENCOUNTER — APPOINTMENT (OUTPATIENT)
Dept: ORTHOPEDIC SURGERY | Facility: CLINIC | Age: 29
End: 2019-07-03
Payer: MEDICAID

## 2019-07-03 PROCEDURE — 73610 X-RAY EXAM OF ANKLE: CPT | Mod: TC,RT

## 2019-07-03 PROCEDURE — 99024 POSTOP FOLLOW-UP VISIT: CPT

## 2019-07-03 NOTE — HISTORY OF PRESENT ILLNESS
[___ Weeks Post Op] : [unfilled] weeks post op [de-identified] : Right ankle sx\par DOS 5/23/19.  [de-identified] : Tracey presents with her father today in office.  She is s/p ORIF right ankle without complaints today.  Her pain scale is 0/10 and she presents using her boot and crutches.  She continues taking ASA for DVT prophylaxis and has no calf pain.  Denies fevers, chills, nightsweats.   [de-identified] : Laterality: Right ankle\par \par General: Alert and oriented x3.  In no acute distress.  Pleasant in nature with a normal affect.  No apparent respiratory distress. \par Erythema, Warmth, Rubor: Negative\par Swelling: Mild swelling\par \par ROM:\par 1. Dorsiflexion: Zero degrees\par 2. Plantarflexion: 30 degrees\par 3. Inversion: Zero degrees\par 4. Eversion: Zero degrees\par \par Tenderness to Palpation: \par 1. Lateral Malleolus: Negative\par 2. Medial Malleolus: Negative\par 3. Proximal Fibular Pain: Negative\par 4. Heel Pain: Negative\par \par Ligament Pain:\par 1. ATFL/CFL/PTFL: Slightly tender\par 2. Deltoid Ligaments: Negative\par \par Stability: \par 1. Anterior Drawer: Negative\par 2. Posterior Drawer: Negative\par \par Strength: 5/5 TA/GS/EHL\par \par Pulses: 2+ DP/PT Pulses\par \par Neuro: Intact motor and sensory\par \par Additional Test:\par 1. Mack's Test: Negative\par 2. Syndesmosis Squeeze Test: Negative\par \par  [de-identified] : X-rays 3 views of the right ankle show good healing response of the fracture fibula. All hardware is intact and no abnormalities seen with the hardware. No other abnormality seen on x-ray today. [de-identified] : Status post ORIF right ankle. [de-identified] : At this point in time I had a long detailed conversation with her and her father in regards to her ankle. She was given an ASO brace today which she can transfer into in two weeks.  I want her to continue wearing her long CAM boot for 2 more weeks she can transfer her off the crutches to weightbearing as tolerated with the boot for the next 2 weeks. She will start outpatient physical therapy in 2 weeks as well. She can come off the aspirin. We will see her back in the office in one month. All of her questions were answered.

## 2019-08-05 ENCOUNTER — APPOINTMENT (OUTPATIENT)
Dept: ORTHOPEDIC SURGERY | Facility: CLINIC | Age: 29
End: 2019-08-05
Payer: MEDICAID

## 2019-08-05 PROCEDURE — 73610 X-RAY EXAM OF ANKLE: CPT | Mod: RT

## 2019-08-05 PROCEDURE — 99024 POSTOP FOLLOW-UP VISIT: CPT

## 2019-08-05 NOTE — HISTORY OF PRESENT ILLNESS
[___ Weeks Post Op] : [unfilled] weeks post op [de-identified] : Right ankle sx\par DOS 5/23/19.  [de-identified] : Tracey is just over 2 months out from her right ankle ORIF. Overall she states that she is doing extremely well and has intermittent 3/10 pain in the ankle. She is walking without assistance and a brace. She is using regular hightop sneakers. She will like to get back to work as of today. She has no complaints and office and is very happy with the outcome of her surgery thus far. [de-identified] : 3 views of the right ankle show a good healing response taking place within the fracture lines. The hardware is in good position and there is no abnormalities seen. [de-identified] : Laterality: Right ankle\par \par General: Alert and oriented x3.  In no acute distress.  Pleasant in nature with a normal affect.  No apparent respiratory distress. \par Erythema, Warmth, Rubor: Negative\par Swelling: Mild swelling present\par \par ROM:\par 1. Dorsiflexion: 10 degrees\par 2. Plantarflexion: 40 degrees\par 3. Inversion: 10 degrees\par 4. Eversion: 10 degrees\par \par Tenderness to Palpation: \par 1. Lateral Malleolus: Negative\par 2. Medial Malleolus: Negative\par 3. Proximal Fibular Pain: Negative\par 4. Heel Pain: Negative\par \par Ligament Pain:\par 1. ATFL/CFL/PTFL: Slightly tender over the ATFL and lateral gutter.\par 2. Deltoid Ligaments: Negative\par \par Stability: \par 1. Anterior Drawer: Negative\par 2. Posterior Drawer: Negative\par \par Strength: 5/5 TA/GS/EHL\par \par Pulses: 2+ DP/PT Pulses\par \par Neuro: Intact motor and sensory\par \par Additional Test:\par 1. Mack's Test: Negative\par 2. Syndesmosis Squeeze Test: Negative\par \par *The incision is clean, dry, intact and well-healed.\par \par  [de-identified] : Status post right ankle ORIF. 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 All of her questions were answered. She will continue with a home exercise program. We will see her back in office in 3 months. All of her questions were answered and she is on board with this treatment plan.

## 2019-09-19 ENCOUNTER — APPOINTMENT (OUTPATIENT)
Dept: ORTHOPEDIC SURGERY | Facility: CLINIC | Age: 29
End: 2019-09-19
Payer: MEDICAID

## 2019-09-19 PROCEDURE — 73610 X-RAY EXAM OF ANKLE: CPT | Mod: RT

## 2019-09-19 PROCEDURE — 99214 OFFICE O/P EST MOD 30 MIN: CPT

## 2019-09-19 NOTE — PHYSICAL EXAM
[de-identified] : General: Alert and oriented x3. In no acute distress. Pleasant in nature with a normal affect. No apparent respiratory distress.\par R Ankle Exam\par Skin: Clean, dry, intact\par Inspection: No obvious malalignment, no swelling, no effusion; no lymphadenopathy\par Pulses: 2+ DP/PT pulses\par ROM: R Ankle 10 degrees of dorsiflexion, 40 degrees of plantarflexion, 10 degrees of subtalar motion\par Tenderness: No tenderness over the lateral malleolus, no CFL/ATFL/PTFL pain. No medial malleolus pain, no deltoid ligament pain. No proximal fibular pain. No heel pain.\par Stability: Negative anterior/posterior drawer.\par Strength: 5/5 TA/GS/EHL\par Neuro: In tact to light touch throughout\par Additional tests: Negative Mortons test, Negative syndesmosis squeeze test. [de-identified] : 3V of the right ankle were ordered obtained and reviewed by me today, 09/19/2019 , revealed: healing noted, screw broken\par \par \par

## 2019-09-19 NOTE — HISTORY OF PRESENT ILLNESS
[FreeTextEntry1] : 29 year old female presenting with right ankle pain. The patient’s pain is noted to be a 4/10. She notes more pain with walking and notes some cracking of the ankle. She notes the pain to be worse compared to the previous visit. The patient has been attending physical therapy for this issue. She is currently taking Advil. No other complaints at this time.\par \par \par

## 2019-09-19 NOTE — ADDENDUM
[FreeTextEntry1] : I, Werner Keesha, acted solely as a scribe for Dr. Raffy Mercedes on this date 09/19/2019 .\par All medical record entries made by the Scribe were at my, Dr. Raffy Mercedes, direction and personally dictated by me on 09/19/2019 . I have reviewed the chart and agree that the record accurately reflects my personal performance of the history, physical exam, assessment and plan. I have also personally directed, reviewed, and agreed with the chart.\par \par \par

## 2019-09-19 NOTE — DISCUSSION/SUMMARY
[de-identified] : Today I had a lengthy discussion with the patient regarding their right ankle pain.I have addressed all the patient's concerns surrounding the pathology of their condition. I recommend that the patient utilize ice, NSAIDS PRN, and heat. They can also elevate their right ankle above the level of the heart. At this time I would like to obtain advanced imaging of the patient's right ankle. A CT scan was ordered so I can find out more about the etiology of the patient's condition. The patient should follow up with the office after obtaining the CT scan. The patient understood and verbally agreed to the treatment plan. All of their questions were answered and they were satisfied with the visit. The patient should call the office if they have any questions or experience worsening symptoms.\par \par \par

## 2019-09-25 ENCOUNTER — FORM ENCOUNTER (OUTPATIENT)
Age: 29
End: 2019-09-25

## 2019-09-26 ENCOUNTER — APPOINTMENT (OUTPATIENT)
Dept: CT IMAGING | Facility: CLINIC | Age: 29
End: 2019-09-26
Payer: MEDICAID

## 2019-09-26 ENCOUNTER — OUTPATIENT (OUTPATIENT)
Dept: OUTPATIENT SERVICES | Facility: HOSPITAL | Age: 29
LOS: 1 days | End: 2019-09-26
Payer: MEDICAID

## 2019-09-26 DIAGNOSIS — S82.841D DISPLACED BIMALLEOLAR FRACTURE OF RIGHT LOWER LEG, SUBSEQUENT ENCOUNTER FOR CLOSED FRACTURE WITH ROUTINE HEALING: ICD-10-CM

## 2019-09-26 DIAGNOSIS — Z00.8 ENCOUNTER FOR OTHER GENERAL EXAMINATION: ICD-10-CM

## 2019-09-26 PROCEDURE — 73700 CT LOWER EXTREMITY W/O DYE: CPT | Mod: 26,RT

## 2019-09-26 PROCEDURE — 73700 CT LOWER EXTREMITY W/O DYE: CPT

## 2019-09-26 PROCEDURE — 76376 3D RENDER W/INTRP POSTPROCES: CPT | Mod: 26

## 2019-09-26 PROCEDURE — 76376 3D RENDER W/INTRP POSTPROCES: CPT

## 2019-10-03 ENCOUNTER — APPOINTMENT (OUTPATIENT)
Dept: ORTHOPEDIC SURGERY | Facility: CLINIC | Age: 29
End: 2019-10-03
Payer: MEDICAID

## 2019-10-03 PROCEDURE — 99214 OFFICE O/P EST MOD 30 MIN: CPT

## 2019-10-03 NOTE — PHYSICAL EXAM
[de-identified] : General: Alert and oriented x3. In no acute distress. Pleasant in nature with a normal affect. No apparent respiratory distress.\par R Ankle Exam\par Skin: Clean, dry, intact\par Inspection: No obvious malalignment, no swelling, no effusion; no lymphadenopathy\par Pulses: 2+ DP/PT pulses\par ROM: R Ankle 10 degrees of dorsiflexion, 40 degrees of plantarflexion, 10 degrees of subtalar motion\par Tenderness: No tenderness over the lateral malleolus, no CFL/ATFL/PTFL pain. No medial malleolus pain, no deltoid ligament pain. No proximal fibular pain. No heel pain.\par Stability: Negative anterior/posterior drawer.\par Strength: 5/5 TA/GS/EHL\par Neuro: In tact to light touch throughout\par Additional tests: Negative Mortons test, Negative syndesmosis squeeze test. [de-identified] : A CT scan was obtained of the right ankle from an outside facility on 9/26/19 and reviewed by me today 10/03/2019  in the office. Revealed: \par Redemonstrated postsurgical changes status post plate and screw fixation of distal fibular shaft fracture. A screw at the level of the distal tibiofibular syndesmosis is fractured. There is some bone callus at the distal fibular fracture site, with incomplete healing and a lucent gap at the fracture site measuring 9 mm superior to inferior.

## 2019-10-03 NOTE — DISCUSSION/SUMMARY
[de-identified] : Today I had a lengthy discussion with the patient regarding their right ankle pain.I have addressed all the patient's concerns surrounding the pathology of their condition. A discussion was had about surgery to remove the broken screw. I recommend that the patient utilize a bone stimulator in order to facilitate further bone healing. The device was ordered for the patient today. Upon approval of the bone stimulator, she should use the device 2 times per day for 20 minutes at a time. I recommend that the patient utilize 50,000 units of vitamin D. A prescription was given in the office today. I advised the patient to avoid high impact activities. I would like to see the patient back in the office in 1 week to reassess their condition upon approval of the bone stimulator. If the bone stimulator is not approved, I would like to see the patient back in the office in 1 month. The patient understood and verbally agreed to the treatment plan. All of their questions were answered and they were satisfied with the visit. The patient should call the office if they have any questions or experience worsening symptoms.\par \par \par

## 2019-10-03 NOTE — HISTORY OF PRESENT ILLNESS
[FreeTextEntry1] : 29 year old female presenting with right ankle pain. The patient’s pain is noted to be a 4/10. The pain and swelling are noted to be the same compared to the previous visit. She presents to review her CT scan results. The patient has been attending physical therapy for this issue. She is currently taking Advil PRN. No other complaints at this time.\par \par \par

## 2019-10-03 NOTE — ADDENDUM
[FreeTextEntry1] : I, Werner Keesha, acted solely as a scribe for Dr. Raffy Mercedes on this date 10/03/2019 .\par All medical record entries made by the Scribe were at my, Dr. Raffy Mercedes, direction and personally dictated by me on 10/03/2019 . I have reviewed the chart and agree that the record accurately reflects my personal performance of the history, physical exam, assessment and plan. I have also personally directed, reviewed, and agreed with the chart.\par \par \par

## 2019-10-07 ENCOUNTER — APPOINTMENT (OUTPATIENT)
Dept: ORTHOPEDIC SURGERY | Facility: CLINIC | Age: 29
End: 2019-10-07
Payer: MEDICAID

## 2019-10-07 PROCEDURE — 20979 LW NTSTY US STIMJ BONE HEALG: CPT | Mod: RT

## 2019-10-07 PROCEDURE — 99212 OFFICE O/P EST SF 10 MIN: CPT | Mod: 25

## 2019-10-07 NOTE — DISCUSSION/SUMMARY
[de-identified] : Today I had a lengthy discussion with the patient regarding their right ankle pain.I have addressed all the patient's concerns surrounding the pathology of their condition. I recommend that the patient utilize a bone stimulator. The patient received instructions on how to operate the device as well as education about the devices functions. She should use the bone stimulator 2 or 3 times per day, 20 minutes each time. The patient understood and verbally agreed to the treatment plan. All of their questions were answered and they were satisfied with the visit. The patient should call the office if they have any questions or experience worsening symptoms.\par \par \par

## 2019-10-07 NOTE — HISTORY OF PRESENT ILLNESS
[FreeTextEntry1] : 29 year old female presenting with right ankle pain. The patient’s pain is noted to be a 4/10. The pain and swelling are noted to be the same compared to the previous visit. The patient has been attending physical therapy for this issue. The patient presents for bone stimulator education. She is currently taking Advil. No other complaints at this time.\par \par \par

## 2019-10-07 NOTE — ADDENDUM
[FreeTextEntry1] : I, Werner Keesha, acted solely as a scribe for Dr. Raffy Mercedes on this date 10/07/2019 .\par All medical record entries made by the Scribe were at my, Dr. Raffy Mercedes, direction and personally dictated by me on 10/07/2019 . I have reviewed the chart and agree that the record accurately reflects my personal performance of the history, physical exam, assessment and plan. I have also personally directed, reviewed, and agreed with the chart.\par \par \par

## 2019-10-07 NOTE — PHYSICAL EXAM
[de-identified] : General: Alert and oriented x3. In no acute distress. Pleasant in nature with a normal affect. No apparent respiratory distress.\par R Ankle Exam\par Skin: Clean, dry, intact\par Inspection: No obvious malalignment, no swelling, no effusion; no lymphadenopathy\par Pulses: 2+ DP/PT pulses\par ROM: R Ankle 10 degrees of dorsiflexion, 40 degrees of plantarflexion, 10 degrees of subtalar motion\par Tenderness: No tenderness over the lateral malleolus, no CFL/ATFL/PTFL pain. No medial malleolus pain, no deltoid ligament pain. No proximal fibular pain. No heel pain.\par Stability: Negative anterior/posterior drawer.\par Strength: 5/5 TA/GS/EHL\par Neuro: In tact to light touch throughout\par Additional tests: Negative Mortons test, Negative syndesmosis squeeze test. [de-identified] : None new obtained.

## 2019-10-24 ENCOUNTER — APPOINTMENT (OUTPATIENT)
Dept: ORTHOPEDIC SURGERY | Facility: CLINIC | Age: 29
End: 2019-10-24
Payer: MEDICAID

## 2019-10-24 PROCEDURE — 99213 OFFICE O/P EST LOW 20 MIN: CPT

## 2019-10-24 PROCEDURE — 73610 X-RAY EXAM OF ANKLE: CPT | Mod: RT,TC

## 2019-10-24 NOTE — PHYSICAL EXAM
[de-identified] : Laterality: Right ankle\par \par General: Alert and oriented x3.  In no acute distress.  Pleasant in nature with a normal affect.  No apparent respiratory distress. \par Erythema, Warmth, Rubor: Negative\par Swelling: Positive swelling and tenderness around the ankle on palpation.\par \par ROM:\par 1. Dorsiflexion: 10 degrees\par 2. Plantarflexion: 40 degrees\par 3. Inversion: 10 degrees\par 4. Eversion: 10 degrees\par \par Tenderness to Palpation: \par 1. Lateral Malleolus: Positive\par 2. Medial Malleolus: Positive\par 3. Proximal Fibular Pain: Negative\par 4. Heel Pain: Negative\par \par Ligament Pain:\par 1. ATFL/CFL/PTFL: Positive over the ATFL.\par 2. Deltoid Ligaments: Negative\par \par Stability: \par 1. Anterior Drawer: Negative\par 2. Posterior Drawer: Negative\par \par Strength: 5/5 TA/GS/EHL\par \par Pulses: 2+ DP/PT Pulses\par \par Neuro: Intact motor and sensory\par \par Additional Test:\par 1. Mack's Test: Negative\par 2. Syndesmosis Squeeze Test: Negative\par \par  [de-identified] : CT scan of the right ankle taken on 9/26/19: Redemonstrated postsurgical changes status post plate and screw fixation of distal fibular shaft fracture.  A screw at the level of the distal tibiofibular syndesmosis is fractured. There is some bone callus at the distal fibular fracture site, with incomplete healing and no loosening Fracture site measuring 9 mm superior to inferior.

## 2019-10-24 NOTE — DISCUSSION/SUMMARY
[de-identified] : Assessment: Status post right ankle ORIF with continued pain/? Painful hardware.\par \par Plan:\par This point in time I would like her to followup with Dr. Mercedes to discuss the continued pain right ankle. All of her questions were answered today in the office. Once I speak with Dr. Mercedes I will have him discuss the next treatment options with her.

## 2019-10-24 NOTE — HISTORY OF PRESENT ILLNESS
[FreeTextEntry1] : Tracey is a 29 y.o. female who presents in office to have her right ankle evaluated.  She is status post right ankle ORIF of the fibula.  She presents in office today with continued pain, 7/10 pain scale with ankle swelling.  She has tried the bone stimulator and continues to have pain and swelling in the right ankle. She is not wearing her support brace today and wearing regular sneakers. She denies numbness and tingling in the right ankle and foot. She does have a CAT scan which was reviewed with her last visit. She has no other complaints and office.

## 2019-10-29 ENCOUNTER — APPOINTMENT (OUTPATIENT)
Dept: NEUROLOGY | Facility: CLINIC | Age: 29
End: 2019-10-29
Payer: MEDICAID

## 2019-10-29 VITALS
WEIGHT: 260 LBS | BODY MASS INDEX: 43.32 KG/M2 | HEART RATE: 76 BPM | SYSTOLIC BLOOD PRESSURE: 109 MMHG | DIASTOLIC BLOOD PRESSURE: 72 MMHG | HEIGHT: 65 IN

## 2019-10-29 DIAGNOSIS — F41.9 ANXIETY DISORDER, UNSPECIFIED: ICD-10-CM

## 2019-10-29 DIAGNOSIS — Z86.69 PERSONAL HISTORY OF OTHER DISEASES OF THE NERVOUS SYSTEM AND SENSE ORGANS: ICD-10-CM

## 2019-10-29 PROCEDURE — 99204 OFFICE O/P NEW MOD 45 MIN: CPT

## 2019-10-29 RX ORDER — AMOXICILLIN AND CLAVULANATE POTASSIUM 875; 125 MG/1; MG/1
875-125 TABLET, COATED ORAL
Qty: 20 | Refills: 0 | Status: DISCONTINUED | COMMUNITY
Start: 2019-02-22 | End: 2019-10-29

## 2019-10-29 NOTE — DISCUSSION/SUMMARY
[FreeTextEntry1] : Ms. Celestin is a 29 year old woman who sustained a right fibula fracture and ankle dislocation in May 2019.\par She is now s/p ORIF. \par During a podiatry visit she was noted to have sensory loss of the left foot.\par She also reports a history of obstructive sleep apnea.\par \par -Left foot numbness - She has mild decreased sensation over the lateral aspect of the left foot. She also has intermittent low back pain. It is possible that she has an S1 radiculopathy.\par We discussed different methods to help clarify this issue including MRI lumbar spine and EMG/NCS.\par She would prefer to hold off on any additional testing since the symptoms are not particularly bothersome to her and she is focused on recovery from her right ankle injury at this time.\par I think this is reasonable since she only has mild sensory changes at this time. I will continue to follow her and discuss these options again with her at a later time.\par \par -Obstructive Sleep Apnea - TIMA was diagnosed 5-6 years ago but she did not think she would be tolerant of CPAP. She would consider an oral appliance.  She still has signs of TIMA including snoring, witnessed apneas and daytime sleepiness ([place] Sleepiness Scale Score 12/24).\par I will order a home sleep study and if she has mild to moderate TIMA, I will refer her for a mandibular advancement device. If she has severe TIMA we will discuss CPAP.\par \par follow-up after sleep study, sooner if needed.

## 2019-10-29 NOTE — REVIEW OF SYSTEMS
[As Noted in HPI] : as noted in HPI [Negative] : Musculoskeletal [de-identified] : imbalance, falls

## 2019-10-29 NOTE — HISTORY OF PRESENT ILLNESS
[FreeTextEntry1] : On May 4th she was riding her skateboard while walking her dog. She fell and broke her fibula and dislocated her ankle. On 5/23/19 she had an ORIF. The surgery was delayed because she had lost her insurance. Recent imaging showed fracture of one of the screws. She does not have a second surgery scheduled at this time.\par She still has pain in the right ankle which is worse with weight bearing.\par \par She recently saw Dr. Guzman for an in grown toenail. \par He was concerned about sensory changes.\par She was not aware of the symptoms but he noted that she had sensory changes on her foot. 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 The pain does radiate down the right leg occasionally. It is intermittent and not severe.\par She does not report weakness of the right leg.\par She is not aware of any sensory changes.\par She was initially wearing a soft cast, then a boot and then a brace before and after her surgery. She is no longer wearing any of these. \par She denies having any neck pain.\par \par She was diagnosed with obstructive sleep apnea bout 5-6 years ago.\par She refused to try CPAP.\par She still feels that she does not have a good night's rest. She sleeps for about 6-7 hours per night. \par She is still aware of snoring. \par Her girlfriend has witnessed pauses in her breathing. \par Her [place] Sleepiness Scale Score is 12/24.\par \par

## 2019-10-29 NOTE — PHYSICAL EXAM
[FreeTextEntry1] : Examination:\par Constitutional: normal, no apparent distress\par oropharynx: narrow, tonsils 1-2+\par Eyes: normal conjunctiva b/l, no ptosis, visual fields full\par Respiratory: no respiratory distress, normal effort, normal auscultation\par Cardiovascular: normal rate, rhythm, no murmurs\par Neck: supple, no masses\par Vascular: carotids normal\par Skin: normal color, no rashes\par Psych: normal mood, affect\par \par Neurological:\par Memory: normal memory, oriented to person, place, time\par Language intact/no aphasia\par Cranial Nerves: II-XII intact, Pupils equally round and reactive to light, ocular muscles/movements intact, no ptosis, no facial weakness, tongue protrudes normally in the midline, \par Motor: normal tone, no pronator drift, full strength in all four extremities in the proximal and distal muscle groups\par Coordination: Fine motor movements intact, rapid alternating movements intact, finger to nose intact bilaterally\par Sensory:  vibration, joint position sense, slight decreased sensation over left lateral foot, slight decreased sensation over right lateral ankle (at incision site), Pinprick intact, negative Romberg examination\par DTRs: symmetric, 2+ in b/l triceps, 2+ in b/l biceps, 2+ in b/l brachioradialis, 2+ in bilateral patellars, 2+ in bilateral Achilles, Babinskis negative bilaterally\par Gait: narrow based, steady, able to walk on toes, tandem gait. Heel walk is difficult.

## 2019-10-29 NOTE — CONSULT LETTER
[Dear  ___] : Dear  [unfilled], [Consult Letter:] : I had the pleasure of evaluating your patient, [unfilled]. [Please see my note below.] : Please see my note below. [Consult Closing:] : Thank you very much for allowing me to participate in the care of this patient.  If you have any questions, please do not hesitate to contact me. [FreeTextEntry2] : Gato Guzman [FreeTextEntry3] : Sincerely,\par \par \par Montserrat Romero MD\par Diplomate, [employer] Academy [employer] and Neu[employer]\par Board Certified in the Subspecialty of Clinical Neurophysiology\par Board Certified in the Subspecialty of Sleep Medicine\par Board Certified in the Subspecialty of Epilepsy\par

## 2019-11-06 ENCOUNTER — APPOINTMENT (OUTPATIENT)
Dept: ORTHOPEDIC SURGERY | Facility: CLINIC | Age: 29
End: 2019-11-06
Payer: MEDICAID

## 2019-11-06 ENCOUNTER — APPOINTMENT (OUTPATIENT)
Dept: NEUROLOGY | Facility: CLINIC | Age: 29
End: 2019-11-06

## 2019-11-06 ENCOUNTER — APPOINTMENT (OUTPATIENT)
Dept: NEUROLOGY | Facility: CLINIC | Age: 29
End: 2019-11-06
Payer: MEDICAID

## 2019-11-06 PROCEDURE — 99213 OFFICE O/P EST LOW 20 MIN: CPT

## 2019-11-06 PROCEDURE — 95806 SLEEP STUDY UNATT&RESP EFFT: CPT

## 2019-11-06 NOTE — PHYSICAL EXAM
[de-identified] : Laterality: Right ankle\par \par General: Alert and oriented x3.  In no acute distress.  Pleasant in nature with a normal affect.  No apparent respiratory distress. \par Erythema, Warmth, Rubor: Negative\par Swelling: Positive swelling and tenderness around the ankle on palpation.\par \par ROM:\par 1. Dorsiflexion: 10 degrees\par 2. Plantarflexion: 40 degrees\par 3. Inversion: 10 degrees\par 4. Eversion: 10 degrees\par \par Tenderness to Palpation: \par 1. Lateral Malleolus: Positive\par 2. Medial Malleolus: Positive\par 3. Proximal Fibular Pain: Negative\par 4. Heel Pain: Negative\par \par Ligament Pain:\par 1. ATFL/CFL/PTFL: Positive over the ATFL.\par 2. Deltoid Ligaments: Negative\par \par Stability: \par 1. Anterior Drawer: Negative\par 2. Posterior Drawer: Negative\par \par Strength: 5/5 TA/GS/EHL\par \par Pulses: 2+ DP/PT Pulses\par \par Neuro: Intact motor and sensory\par \par Additional Test:\par 1. Mack's Test: Negative\par 2. Syndesmosis Squeeze Test: Negative [de-identified] : 3V of the right ankle were ordered obtained and reviewed by me today, 11/06/2019 , revealed: unchanged, screw fracture, stable mortis\par \par

## 2019-11-06 NOTE — HISTORY OF PRESENT ILLNESS
[FreeTextEntry1] : 29 year old female presenting with right ankle pain. The patient’s pain is noted to be a 3/10. The pain is noted to be the same compared to the previous visit, and the swelling is noted to be 15% improved. The patient has been attending physical therapy for this issue. She has been taking vitamin D and utilizing a bone stimulator. She is currently taking Advil. No other complaints at this time.\par \par \par

## 2019-11-06 NOTE — DISCUSSION/SUMMARY
[de-identified] : Today I had a lengthy discussion with the patient regarding their right ankle pain.I have addressed all the patient's concerns surrounding the pathology of their condition. A discussion was had about a possible surgery. I would like to see the patient back in the office in 3-4 weeks to reassess their condition. The patient understood and verbally agreed to the treatment plan. All of their questions were answered and they were satisfied with the visit. 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 Additionally she has some discomfort over the distal fibula fracture site which appears to have limited healing at this time and confirmed by the CAT scan to be without significant osseous bony bridging. There is a fracture to the syndesmotic screw. This indicates pistoning and likely ability of the distal fibula. I did discuss with her at length the possibility of a revision surgical procedure which would involve prolonged nonweightbearing time an additional syndesmotic fixation. She will continue to contemplate the options. I will see her back in 3-4 weeks to revisit this.\par

## 2019-11-06 NOTE — ADDENDUM
[FreeTextEntry1] : I, Werner Keesha, acted solely as a scribe for Dr. Raffy Mercedes on this date 11/06/2019 .\par All medical record entries made by the Scribe were at my, Dr. Raffy Mercedes, direction and personally dictated by me on 11/06/2019 . I have reviewed the chart and agree that the record accurately reflects my personal performance of the history, physical exam, assessment and plan. I have also personally directed, reviewed, and agreed with the chart.\par \par \par

## 2019-11-20 ENCOUNTER — APPOINTMENT (OUTPATIENT)
Dept: NEUROLOGY | Facility: CLINIC | Age: 29
End: 2019-11-20
Payer: MEDICAID

## 2019-11-20 VITALS
HEIGHT: 65 IN | DIASTOLIC BLOOD PRESSURE: 67 MMHG | SYSTOLIC BLOOD PRESSURE: 102 MMHG | WEIGHT: 266 LBS | BODY MASS INDEX: 44.32 KG/M2 | HEART RATE: 84 BPM

## 2019-11-20 DIAGNOSIS — R20.8 OTHER DISTURBANCES OF SKIN SENSATION: ICD-10-CM

## 2019-11-20 PROCEDURE — 99213 OFFICE O/P EST LOW 20 MIN: CPT

## 2019-11-20 NOTE — DATA REVIEWED
[de-identified] : Home sleep study 11/6/19:\par AHI 14.8. Not enough time spent in the non-supine position to determine if positional influence is a significant factor.

## 2019-11-20 NOTE — DISCUSSION/SUMMARY
[FreeTextEntry1] : Ms. Celestin is a 29 year old woman who sustained a right fibula fracture and ankle dislocation in May 2019.\par She is now s/p ORIF. \par During a podiatry visit she was noted to have sensory loss of the left foot.\par She also reports a history of obstructive sleep apnea.\par \par -Left foot numbness - She has mild decreased sensation over the lateral aspect of the left foot. She also has intermittent low back pain. It is possible that she has an S1 radiculopathy.\par Symptoms are not bothersome at this time and she is focused on her surgery for right ankle.\par Will revisit this issue if symptoms worsen. \par \par \par -Obstructive Sleep Apnea - Home sleep study shows mild TIMA with AHI of 14.8.\par We discussed the risks of untreated sleep apnea including sleepiness, hypertension, increased risk for heart disease and stroke, worsening seizure frequency and cognitive impairment.\par We discussed different treatments for TIMA including CPAP, oral appliance, surgery and weight loss. \par \par She does not want CPAP at this time.\par Will refer to [facility] for evaluation for possible oral appliance.\par Repeat sleep study after appliance is optimally adjusted.\par If not effective can consider surgical consultation with ENT. \par \par f/u after device is optimally adjusted.\par \par \par \par follow-up after sleep study, sooner if needed. \par

## 2019-11-20 NOTE — PHYSICAL EXAM
[FreeTextEntry1] : Examination:\par Constitutional: normal, no apparent distress\par Eyes: normal conjunctiva b/l, no ptosis, visual fields full\par oropharynx: narrow, tonsils 1-2+\par Respiratory: no respiratory distress, normal effort, normal auscultation\par Cardiovascular: normal rate, rhythm, no murmurs\par Neck: supple, no masses\par Vascular: carotids normal\par Skin: normal color, no rashes\par Psych: normal mood, affect\par \par Neurological:\par Memory: normal memory, oriented to person, place, time\par Language intact/no aphasia\par Cranial Nerves: II-XII intact, Pupils equally round and reactive to light, ocular muscles/movements intact, no ptosis, no facial weakness, tongue protrudes normally in the midline, \par Motor: normal tone, no pronator drift, full strength in all four extremities in the proximal and distal muscle groups\par Coordination: Fine motor movements intact, rapid alternating movements intact, finger to nose intact bilaterally\par Sensory: intact to light touch\par DTRs: symmetric, normal\par Gait: narrow based, steady\par

## 2019-11-20 NOTE — HISTORY OF PRESENT ILLNESS
[FreeTextEntry1] : I last saw Ms. Celestin on 10/29/19.\par She has a second surgery scheduled on her ankle for 12/17/19 due to a fracture to the syndesmotic screw.\par \par She has not noticed any numbness/tingling in her feet.\par \par She continues to sleep alone.\par She does feel tired during the day. She sleeps for 6-7 hours per night. She feels that this can be partially related to long work days. \par \par Her [place] Sleepiness Scale Score is 10/24.\par \par

## 2019-11-22 ENCOUNTER — APPOINTMENT (OUTPATIENT)
Dept: ORTHOPEDIC SURGERY | Facility: CLINIC | Age: 29
End: 2019-11-22
Payer: MEDICAID

## 2019-11-22 PROCEDURE — 73610 X-RAY EXAM OF ANKLE: CPT | Mod: RT

## 2019-11-22 PROCEDURE — 99213 OFFICE O/P EST LOW 20 MIN: CPT

## 2019-11-22 NOTE — ADDENDUM
[FreeTextEntry1] : I, Werner Keesha, acted solely as a scribe for Dr. Raffy Mercedes on this date 11/22/2019 .\par All medical record entries made by the Scribe were at my, Dr. Raffy Mercedes, direction and personally dictated by me on 11/22/2019 . I have reviewed the chart and agree that the record accurately reflects my personal performance of the history, physical exam, assessment and plan. I have also personally directed, reviewed, and agreed with the chart.\par \par \par

## 2019-11-22 NOTE — PHYSICAL EXAM
[de-identified] : Laterality: Right ankle\par \par General: Alert and oriented x3.  In no acute distress.  Pleasant in nature with a normal affect.  No apparent respiratory distress. \par Erythema, Warmth, Rubor: Negative\par Swelling: Positive swelling and tenderness around the ankle on palpation.\par \par ROM:\par 1. Dorsiflexion: 10 degrees\par 2. Plantarflexion: 40 degrees\par 3. Inversion: 10 degrees\par 4. Eversion: 10 degrees\par \par Tenderness to Palpation: \par 1. Lateral Malleolus: Positive\par 2. Medial Malleolus: Positive\par 3. Proximal Fibular Pain: Negative\par 4. Heel Pain: Negative\par \par Ligament Pain:\par 1. ATFL/CFL/PTFL: Positive over the ATFL.\par 2. Deltoid Ligaments: Negative\par \par Stability: \par 1. Anterior Drawer: Negative\par 2. Posterior Drawer: Negative\par \par Strength: 5/5 TA/GS/EHL\par \par Pulses: 2+ DP/PT Pulses\par \par Neuro: Intact motor and sensory\par \par Additional Test:\par 1. Mack's Test: Negative\par 2. Syndesmosis Squeeze Test: Negative [de-identified] : 3V of the right ankle were ordered obtained and reviewed by me today, 11/22/2019 , revealed: unchanged, screw fracture\par \par \par

## 2019-11-22 NOTE — HISTORY OF PRESENT ILLNESS
[FreeTextEntry1] : 29 year old female presenting with right ankle pain. The patient’s pain is noted to be a 3/10. The pain is noted to be the same, and the swelling is worse compared to the previous visit. The patient has been attending physical therapy for this issue. She is currently taking Advil. No other complaints at this time.\par \par \par

## 2019-11-22 NOTE — DISCUSSION/SUMMARY
[de-identified] : Today I had a lengthy discussion with the patient regarding their right ankle pain.I have addressed all the patient's concerns surrounding the pathology of their condition. At this time I would like the patient to get blood work in about 1 week to further evaluate for a possible infection. I ordered the blood work for the patient in the office today. A discussion was had about surgery. A lengthy discussion was had about the surgery. All risks, benefits and alternatives to the recommended surgical procedure were discussed which include but are not limited to bleeding, infection, nerve damage, vascular damage, failure of the wound to heal, the need for further surgery, loss of limb, DVT, PE, loss of life as well as the risks associated with general anesthesia. The patient verbalized understanding and provided informed consent to move forward with surgery. The patient understood and verbally agreed to the treatment plan. All of their questions were answered and they were satisfied with the visit. The patient should call the office if they have any questions or experience worsening symptoms.\par \par \par

## 2019-12-05 LAB
24R-OH-CALCIDIOL SERPL-MCNC: 37.2 PG/ML
25(OH)D3 SERPL-MCNC: 45.9 NG/ML
ALBUMIN SERPL ELPH-MCNC: 4.1 G/DL
ALP BLD-CCNC: 118 U/L
ALT SERPL-CCNC: 10 U/L
ANION GAP SERPL CALC-SCNC: 12 MMOL/L
APPEARANCE: ABNORMAL
APTT BLD: 31.4 SEC
AST SERPL-CCNC: 14 U/L
BASOPHILS # BLD AUTO: 0.06 K/UL
BASOPHILS NFR BLD AUTO: 0.7 %
BILIRUB SERPL-MCNC: 0.2 MG/DL
BILIRUBIN URINE: NEGATIVE
BLOOD URINE: NEGATIVE
BUN SERPL-MCNC: 11 MG/DL
CALCIUM SERPL-MCNC: 9.1 MG/DL
CHLORIDE SERPL-SCNC: 103 MMOL/L
CO2 SERPL-SCNC: 28 MMOL/L
COLOR: YELLOW
CREAT SERPL-MCNC: 1.09 MG/DL
CRP SERPL-MCNC: 0.73 MG/DL
EOSINOPHIL # BLD AUTO: 0.44 K/UL
EOSINOPHIL NFR BLD AUTO: 5.5 %
ERYTHROCYTE [SEDIMENTATION RATE] IN BLOOD BY WESTERGREN METHOD: 36 MM/HR
GLUCOSE QUALITATIVE U: NEGATIVE
GLUCOSE SERPL-MCNC: 100 MG/DL
HCG SERPL QL: NEGATIVE
HCT VFR BLD CALC: 41.8 %
HGB BLD-MCNC: 13.2 G/DL
IMM GRANULOCYTES NFR BLD AUTO: 0.2 %
INR PPP: 0.97 RATIO
KETONES URINE: NEGATIVE
LEUKOCYTE ESTERASE URINE: NEGATIVE
LYMPHOCYTES # BLD AUTO: 2.78 K/UL
LYMPHOCYTES NFR BLD AUTO: 34.5 %
MAN DIFF?: NORMAL
MCHC RBC-ENTMCNC: 27.3 PG
MCHC RBC-ENTMCNC: 31.6 GM/DL
MCV RBC AUTO: 86.5 FL
MONOCYTES # BLD AUTO: 0.55 K/UL
MONOCYTES NFR BLD AUTO: 6.8 %
NEUTROPHILS # BLD AUTO: 4.2 K/UL
NEUTROPHILS NFR BLD AUTO: 52.3 %
NITRITE URINE: NEGATIVE
PAPP-A SERPL-ACNC: <1 MIU/ML
PH URINE: 7
PLATELET # BLD AUTO: 296 K/UL
POTASSIUM SERPL-SCNC: 4 MMOL/L
PROT SERPL-MCNC: 6.6 G/DL
PROTEIN URINE: NORMAL
PT BLD: 11 SEC
RBC # BLD: 4.83 M/UL
RBC # FLD: 13.2 %
SODIUM SERPL-SCNC: 143 MMOL/L
SPECIFIC GRAVITY URINE: 1.03
UROBILINOGEN URINE: NORMAL
WBC # FLD AUTO: 8.05 K/UL

## 2019-12-10 ENCOUNTER — OUTPATIENT (OUTPATIENT)
Dept: OUTPATIENT SERVICES | Facility: HOSPITAL | Age: 29
LOS: 1 days | End: 2019-12-10
Payer: MEDICAID

## 2019-12-10 VITALS
HEART RATE: 69 BPM | WEIGHT: 261.03 LBS | TEMPERATURE: 98 F | RESPIRATION RATE: 16 BRPM | OXYGEN SATURATION: 100 % | HEIGHT: 65 IN | SYSTOLIC BLOOD PRESSURE: 106 MMHG | DIASTOLIC BLOOD PRESSURE: 59 MMHG

## 2019-12-10 DIAGNOSIS — Z01.818 ENCOUNTER FOR OTHER PREPROCEDURAL EXAMINATION: ICD-10-CM

## 2019-12-10 DIAGNOSIS — S82.841D DISPLACED BIMALLEOLAR FRACTURE OF RIGHT LOWER LEG, SUBSEQUENT ENCOUNTER FOR CLOSED FRACTURE WITH ROUTINE HEALING: ICD-10-CM

## 2019-12-10 DIAGNOSIS — T84.84XA PAIN DUE TO INTERNAL ORTHOPEDIC PROSTHETIC DEVICES, IMPLANTS AND GRAFTS, INITIAL ENCOUNTER: ICD-10-CM

## 2019-12-10 DIAGNOSIS — Z98.890 OTHER SPECIFIED POSTPROCEDURAL STATES: Chronic | ICD-10-CM

## 2019-12-10 LAB
ANION GAP SERPL CALC-SCNC: 5 MMOL/L — SIGNIFICANT CHANGE UP (ref 5–17)
BASOPHILS # BLD AUTO: 0.05 K/UL — SIGNIFICANT CHANGE UP (ref 0–0.2)
BASOPHILS NFR BLD AUTO: 0.7 % — SIGNIFICANT CHANGE UP (ref 0–2)
BUN SERPL-MCNC: 13 MG/DL — SIGNIFICANT CHANGE UP (ref 7–23)
CALCIUM SERPL-MCNC: 9.1 MG/DL — SIGNIFICANT CHANGE UP (ref 8.5–10.1)
CHLORIDE SERPL-SCNC: 109 MMOL/L — HIGH (ref 96–108)
CO2 SERPL-SCNC: 26 MMOL/L — SIGNIFICANT CHANGE UP (ref 22–31)
CREAT SERPL-MCNC: 0.95 MG/DL — SIGNIFICANT CHANGE UP (ref 0.5–1.3)
CRP SERPL-MCNC: 0.56 MG/DL — HIGH (ref 0–0.4)
EOSINOPHIL # BLD AUTO: 0.41 K/UL — SIGNIFICANT CHANGE UP (ref 0–0.5)
EOSINOPHIL NFR BLD AUTO: 6 % — SIGNIFICANT CHANGE UP (ref 0–6)
ERYTHROCYTE [SEDIMENTATION RATE] IN BLOOD: 20 MM/HR — HIGH (ref 0–15)
GLUCOSE SERPL-MCNC: 89 MG/DL — SIGNIFICANT CHANGE UP (ref 70–99)
HCT VFR BLD CALC: 41.7 % — SIGNIFICANT CHANGE UP (ref 34.5–45)
HGB BLD-MCNC: 13.9 G/DL — SIGNIFICANT CHANGE UP (ref 11.5–15.5)
IMM GRANULOCYTES NFR BLD AUTO: 0.1 % — SIGNIFICANT CHANGE UP (ref 0–1.5)
LYMPHOCYTES # BLD AUTO: 2.11 K/UL — SIGNIFICANT CHANGE UP (ref 1–3.3)
LYMPHOCYTES # BLD AUTO: 31 % — SIGNIFICANT CHANGE UP (ref 13–44)
MCHC RBC-ENTMCNC: 28.4 PG — SIGNIFICANT CHANGE UP (ref 27–34)
MCHC RBC-ENTMCNC: 33.3 GM/DL — SIGNIFICANT CHANGE UP (ref 32–36)
MCV RBC AUTO: 85.1 FL — SIGNIFICANT CHANGE UP (ref 80–100)
MONOCYTES # BLD AUTO: 0.44 K/UL — SIGNIFICANT CHANGE UP (ref 0–0.9)
MONOCYTES NFR BLD AUTO: 6.5 % — SIGNIFICANT CHANGE UP (ref 2–14)
NEUTROPHILS # BLD AUTO: 3.79 K/UL — SIGNIFICANT CHANGE UP (ref 1.8–7.4)
NEUTROPHILS NFR BLD AUTO: 55.7 % — SIGNIFICANT CHANGE UP (ref 43–77)
PLATELET # BLD AUTO: 295 K/UL — SIGNIFICANT CHANGE UP (ref 150–400)
POTASSIUM SERPL-MCNC: 4 MMOL/L — SIGNIFICANT CHANGE UP (ref 3.5–5.3)
POTASSIUM SERPL-SCNC: 4 MMOL/L — SIGNIFICANT CHANGE UP (ref 3.5–5.3)
RBC # BLD: 4.9 M/UL — SIGNIFICANT CHANGE UP (ref 3.8–5.2)
RBC # FLD: 13.3 % — SIGNIFICANT CHANGE UP (ref 10.3–14.5)
SODIUM SERPL-SCNC: 140 MMOL/L — SIGNIFICANT CHANGE UP (ref 135–145)
WBC # BLD: 6.81 K/UL — SIGNIFICANT CHANGE UP (ref 3.8–10.5)
WBC # FLD AUTO: 6.81 K/UL — SIGNIFICANT CHANGE UP (ref 3.8–10.5)

## 2019-12-10 PROCEDURE — 80048 BASIC METABOLIC PNL TOTAL CA: CPT

## 2019-12-10 PROCEDURE — 85025 COMPLETE CBC W/AUTO DIFF WBC: CPT

## 2019-12-10 PROCEDURE — 36415 COLL VENOUS BLD VENIPUNCTURE: CPT

## 2019-12-10 PROCEDURE — 85652 RBC SED RATE AUTOMATED: CPT

## 2019-12-10 PROCEDURE — 86140 C-REACTIVE PROTEIN: CPT

## 2019-12-10 PROCEDURE — G0463: CPT | Mod: 25

## 2019-12-10 NOTE — H&P PST ADULT - ASSESSMENT
29 year old female she presents to PST for revision of right ankle ORIF     Plan:  1. PST instructions given ; NPO post midnight   2. Pt instructed to take following meds with sip of water : trintellix and wellbutrin   3. EZ wash instructions given   4. Urine for pregnancy on day of surgery   5. Stop NSAIDS ( Aspirin Alev Motrin Mobic Diclofenac), herbal supplements , MVI , Vitamin fish oil 7 days prior to surgery  unless directed by surgeon or cardiologist;   6. Labs  as per surgeon request

## 2019-12-10 NOTE — H&P PST ADULT - HISTORY OF PRESENT ILLNESS
29 year old female with closed bimalleolar fracture, painful orthopedic hardware closed fracture of distal end right fibula; Pt said she fell May 2019 while riding a long board and walking her dog S/P ORIF; still c/o pain with ROM , edema and numbness and tingling she presents to PST for revision of right ankle ORIF

## 2019-12-10 NOTE — H&P PST ADULT - NSICDXPASTMEDICALHX_GEN_ALL_CORE_FT
PAST MEDICAL HISTORY:  Allergies     Asthma exercise induced    Depression     Fracture right tibia    Heart murmur     TIMA (obstructive sleep apnea) PAST MEDICAL HISTORY:  Allergies     Asthma exercise induced    Depression     Fracture closed bimalleolar fracture and distal end of fibula    Heart murmur     TIMA (obstructive sleep apnea)

## 2019-12-11 ENCOUNTER — OTHER (OUTPATIENT)
Age: 29
End: 2019-12-11

## 2019-12-11 DIAGNOSIS — S82.841D DISPLACED BIMALLEOLAR FRACTURE OF RIGHT LOWER LEG, SUBSEQUENT ENCOUNTER FOR CLOSED FRACTURE WITH ROUTINE HEALING: ICD-10-CM

## 2019-12-11 DIAGNOSIS — Z01.818 ENCOUNTER FOR OTHER PREPROCEDURAL EXAMINATION: ICD-10-CM

## 2019-12-11 DIAGNOSIS — T84.84XA PAIN DUE TO INTERNAL ORTHOPEDIC PROSTHETIC DEVICES, IMPLANTS AND GRAFTS, INITIAL ENCOUNTER: ICD-10-CM

## 2019-12-11 PROBLEM — R01.1 CARDIAC MURMUR, UNSPECIFIED: Chronic | Status: ACTIVE | Noted: 2019-12-10

## 2019-12-11 PROBLEM — G47.33 OBSTRUCTIVE SLEEP APNEA (ADULT) (PEDIATRIC): Chronic | Status: ACTIVE | Noted: 2019-12-10

## 2019-12-11 PROBLEM — J45.909 UNSPECIFIED ASTHMA, UNCOMPLICATED: Chronic | Status: ACTIVE | Noted: 2019-12-10

## 2019-12-12 ENCOUNTER — OUTPATIENT (OUTPATIENT)
Dept: INPATIENT UNIT | Facility: HOSPITAL | Age: 29
LOS: 1 days | Discharge: HOME | End: 2019-12-12
Payer: MEDICAID

## 2019-12-12 VITALS
WEIGHT: 229.94 LBS | TEMPERATURE: 98 F | HEART RATE: 67 BPM | DIASTOLIC BLOOD PRESSURE: 53 MMHG | OXYGEN SATURATION: 100 % | RESPIRATION RATE: 16 BRPM | SYSTOLIC BLOOD PRESSURE: 106 MMHG | HEIGHT: 65 IN

## 2019-12-12 VITALS
RESPIRATION RATE: 16 BRPM | HEART RATE: 70 BPM | SYSTOLIC BLOOD PRESSURE: 123 MMHG | OXYGEN SATURATION: 99 % | DIASTOLIC BLOOD PRESSURE: 62 MMHG | TEMPERATURE: 98 F

## 2019-12-12 DIAGNOSIS — R01.1 CARDIAC MURMUR, UNSPECIFIED: ICD-10-CM
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
## 2019-12-12 DIAGNOSIS — S82.401K: ICD-10-CM

## 2019-12-12 DIAGNOSIS — Z98.890 OTHER SPECIFIED POSTPROCEDURAL STATES: Chronic | ICD-10-CM

## 2019-12-12 DIAGNOSIS — J45.909 UNSPECIFIED ASTHMA, UNCOMPLICATED: ICD-10-CM

## 2019-12-12 DIAGNOSIS — F32.9 MAJOR DEPRESSIVE DISORDER, SINGLE EPISODE, UNSPECIFIED: ICD-10-CM

## 2019-12-12 DIAGNOSIS — G47.33 OBSTRUCTIVE SLEEP APNEA (ADULT) (PEDIATRIC): ICD-10-CM

## 2019-12-12 DIAGNOSIS — S82.841D DISPLACED BIMALLEOLAR FRACTURE OF RIGHT LOWER LEG, SUBSEQUENT ENCOUNTER FOR CLOSED FRACTURE WITH ROUTINE HEALING: ICD-10-CM

## 2019-12-12 LAB
APTT BLD: 35 SEC — SIGNIFICANT CHANGE UP (ref 27.5–36.3)
HCG UR QL: NEGATIVE — SIGNIFICANT CHANGE UP
INR BLD: 1.12 RATIO — SIGNIFICANT CHANGE UP (ref 0.88–1.16)
PROTHROM AB SERPL-ACNC: 12.5 SEC — SIGNIFICANT CHANGE UP (ref 10–12.9)

## 2019-12-12 PROCEDURE — 81025 URINE PREGNANCY TEST: CPT

## 2019-12-12 PROCEDURE — 87075 CULTR BACTERIA EXCEPT BLOOD: CPT

## 2019-12-12 PROCEDURE — 36415 COLL VENOUS BLD VENIPUNCTURE: CPT

## 2019-12-12 PROCEDURE — 87070 CULTURE OTHR SPECIMN AEROBIC: CPT

## 2019-12-12 PROCEDURE — 77012 CT SCAN FOR NEEDLE BIOPSY: CPT

## 2019-12-12 PROCEDURE — 77012 CT SCAN FOR NEEDLE BIOPSY: CPT | Mod: 26

## 2019-12-12 PROCEDURE — 20220 BONE BIOPSY TROCAR/NDL SUPFC: CPT

## 2019-12-12 PROCEDURE — 85610 PROTHROMBIN TIME: CPT

## 2019-12-12 PROCEDURE — 85730 THROMBOPLASTIN TIME PARTIAL: CPT

## 2019-12-12 RX ORDER — ONDANSETRON 8 MG/1
4 TABLET, FILM COATED ORAL ONCE
Refills: 0 | Status: DISCONTINUED | OUTPATIENT
Start: 2019-12-12 | End: 2019-12-12

## 2019-12-12 RX ORDER — SODIUM CHLORIDE 9 MG/ML
1000 INJECTION INTRAMUSCULAR; INTRAVENOUS; SUBCUTANEOUS
Refills: 0 | Status: DISCONTINUED | OUTPATIENT
Start: 2019-12-12 | End: 2019-12-12

## 2019-12-12 RX ORDER — HYDROMORPHONE HYDROCHLORIDE 2 MG/ML
0.5 INJECTION INTRAMUSCULAR; INTRAVENOUS; SUBCUTANEOUS
Refills: 0 | Status: DISCONTINUED | OUTPATIENT
Start: 2019-12-12 | End: 2019-12-12

## 2019-12-12 RX ORDER — FENTANYL CITRATE 50 UG/ML
50 INJECTION INTRAVENOUS
Refills: 0 | Status: DISCONTINUED | OUTPATIENT
Start: 2019-12-12 | End: 2019-12-12

## 2019-12-12 RX ORDER — ACETAMINOPHEN 500 MG
1000 TABLET ORAL ONCE
Refills: 0 | Status: DISCONTINUED | OUTPATIENT
Start: 2019-12-12 | End: 2019-12-12

## 2019-12-12 RX ORDER — OXYCODONE HYDROCHLORIDE 5 MG/1
10 TABLET ORAL ONCE
Refills: 0 | Status: DISCONTINUED | OUTPATIENT
Start: 2019-12-12 | End: 2019-12-12

## 2019-12-12 NOTE — ASU PATIENT PROFILE, ADULT - PMH
Allergies    Asthma  exercise induced  Depression    Fracture  closed bimalleolar fracture and distal end of fibula  Heart murmur    TIMA (obstructive sleep apnea)

## 2019-12-16 RX ORDER — OXYCODONE HYDROCHLORIDE 5 MG/1
10 TABLET ORAL ONCE
Refills: 0 | Status: DISCONTINUED | OUTPATIENT
Start: 2019-12-17 | End: 2019-12-17

## 2019-12-16 RX ORDER — SODIUM CHLORIDE 9 MG/ML
3 INJECTION INTRAMUSCULAR; INTRAVENOUS; SUBCUTANEOUS EVERY 8 HOURS
Refills: 0 | Status: DISCONTINUED | OUTPATIENT
Start: 2019-12-17 | End: 2019-12-17

## 2019-12-16 RX ORDER — ONDANSETRON 8 MG/1
4 TABLET, FILM COATED ORAL ONCE
Refills: 0 | Status: DISCONTINUED | OUTPATIENT
Start: 2019-12-17 | End: 2019-12-17

## 2019-12-16 RX ORDER — FENTANYL CITRATE 50 UG/ML
50 INJECTION INTRAVENOUS
Refills: 0 | Status: DISCONTINUED | OUTPATIENT
Start: 2019-12-17 | End: 2019-12-17

## 2019-12-16 RX ORDER — SODIUM CHLORIDE 9 MG/ML
1000 INJECTION, SOLUTION INTRAVENOUS
Refills: 0 | Status: DISCONTINUED | OUTPATIENT
Start: 2019-12-17 | End: 2019-12-17

## 2019-12-16 RX ORDER — HYDROMORPHONE HYDROCHLORIDE 2 MG/ML
0.5 INJECTION INTRAMUSCULAR; INTRAVENOUS; SUBCUTANEOUS
Refills: 0 | Status: DISCONTINUED | OUTPATIENT
Start: 2019-12-17 | End: 2019-12-17

## 2019-12-17 ENCOUNTER — OUTPATIENT (OUTPATIENT)
Dept: INPATIENT UNIT | Facility: HOSPITAL | Age: 29
LOS: 1 days | Discharge: HOME | End: 2019-12-17
Payer: MEDICAID

## 2019-12-17 ENCOUNTER — APPOINTMENT (OUTPATIENT)
Dept: ORTHOPEDIC SURGERY | Facility: HOSPITAL | Age: 29
End: 2019-12-17

## 2019-12-17 VITALS
HEART RATE: 84 BPM | SYSTOLIC BLOOD PRESSURE: 120 MMHG | OXYGEN SATURATION: 98 % | TEMPERATURE: 98 F | RESPIRATION RATE: 18 BRPM | DIASTOLIC BLOOD PRESSURE: 61 MMHG

## 2019-12-17 VITALS
OXYGEN SATURATION: 98 % | TEMPERATURE: 98 F | SYSTOLIC BLOOD PRESSURE: 97 MMHG | HEIGHT: 65 IN | HEART RATE: 77 BPM | DIASTOLIC BLOOD PRESSURE: 58 MMHG | WEIGHT: 261.03 LBS | RESPIRATION RATE: 16 BRPM

## 2019-12-17 DIAGNOSIS — Z98.890 OTHER SPECIFIED POSTPROCEDURAL STATES: Chronic | ICD-10-CM

## 2019-12-17 DIAGNOSIS — T84.84XA PAIN DUE TO INTERNAL ORTHOPEDIC PROSTHETIC DEVICES, IMPLANTS AND GRAFTS, INITIAL ENCOUNTER: ICD-10-CM

## 2019-12-17 DIAGNOSIS — S82.841D DISPLACED BIMALLEOLAR FRACTURE OF RIGHT LOWER LEG, SUBSEQUENT ENCOUNTER FOR CLOSED FRACTURE WITH ROUTINE HEALING: ICD-10-CM

## 2019-12-17 LAB — HCG UR QL: NEGATIVE — SIGNIFICANT CHANGE UP

## 2019-12-17 PROCEDURE — 20680 REMOVAL OF IMPLANT DEEP: CPT | Mod: RT

## 2019-12-17 PROCEDURE — 81025 URINE PREGNANCY TEST: CPT

## 2019-12-17 PROCEDURE — C1889: CPT

## 2019-12-17 PROCEDURE — 76000 FLUOROSCOPY <1 HR PHYS/QHP: CPT

## 2019-12-17 RX ORDER — FAMOTIDINE 10 MG/ML
20 INJECTION INTRAVENOUS ONCE
Refills: 0 | Status: COMPLETED | OUTPATIENT
Start: 2019-12-17 | End: 2019-12-17

## 2019-12-17 RX ORDER — MUPIROCIN 20 MG/G
1 OINTMENT TOPICAL
Qty: 0 | Refills: 0 | DISCHARGE

## 2019-12-17 RX ORDER — ONDANSETRON 8 MG/1
1 TABLET, FILM COATED ORAL
Qty: 25 | Refills: 0
Start: 2019-12-17

## 2019-12-17 RX ORDER — DOCUSATE SODIUM 100 MG
1 CAPSULE ORAL
Qty: 60 | Refills: 0
Start: 2019-12-17 | End: 2020-01-05

## 2019-12-17 RX ORDER — ACETAMINOPHEN 500 MG
975 TABLET ORAL ONCE
Refills: 0 | Status: COMPLETED | OUTPATIENT
Start: 2019-12-17 | End: 2019-12-17

## 2019-12-17 RX ORDER — ASPIRIN/CALCIUM CARB/MAGNESIUM 324 MG
1 TABLET ORAL
Qty: 30 | Refills: 0
Start: 2019-12-17 | End: 2020-01-15

## 2019-12-17 RX ORDER — OXYCODONE HYDROCHLORIDE 5 MG/1
1 TABLET ORAL
Qty: 30 | Refills: 0
Start: 2019-12-17

## 2019-12-17 RX ADMIN — HYDROMORPHONE HYDROCHLORIDE 0.5 MILLIGRAM(S): 2 INJECTION INTRAMUSCULAR; INTRAVENOUS; SUBCUTANEOUS at 14:44

## 2019-12-17 RX ADMIN — FAMOTIDINE 20 MILLIGRAM(S): 10 INJECTION INTRAVENOUS at 09:43

## 2019-12-17 RX ADMIN — HYDROMORPHONE HYDROCHLORIDE 0.5 MILLIGRAM(S): 2 INJECTION INTRAMUSCULAR; INTRAVENOUS; SUBCUTANEOUS at 13:51

## 2019-12-17 RX ADMIN — HYDROMORPHONE HYDROCHLORIDE 0.5 MILLIGRAM(S): 2 INJECTION INTRAMUSCULAR; INTRAVENOUS; SUBCUTANEOUS at 14:27

## 2019-12-17 RX ADMIN — HYDROMORPHONE HYDROCHLORIDE 0.5 MILLIGRAM(S): 2 INJECTION INTRAMUSCULAR; INTRAVENOUS; SUBCUTANEOUS at 13:39

## 2019-12-17 RX ADMIN — Medication 975 MILLIGRAM(S): at 09:43

## 2019-12-17 RX ADMIN — SODIUM CHLORIDE 3 MILLILITER(S): 9 INJECTION INTRAMUSCULAR; INTRAVENOUS; SUBCUTANEOUS at 15:45

## 2019-12-17 RX ADMIN — SODIUM CHLORIDE 75 MILLILITER(S): 9 INJECTION, SOLUTION INTRAVENOUS at 15:45

## 2019-12-17 RX ADMIN — Medication 975 MILLIGRAM(S): at 09:45

## 2019-12-17 NOTE — ASU PATIENT PROFILE, ADULT - PSH
H/O surgical biopsy  right ankle  S/P ORIF (open reduction internal fixation) fracture  right leg 5/2019

## 2019-12-17 NOTE — ASU DISCHARGE PLAN (ADULT/PEDIATRIC) - CARE PROVIDER_API CALL
Raffy Mercedes (DO)  Orthopaedic Surgery  [address] [place] [address]  Phone: [phone]  Fax: [phone]  Follow Up Time:

## 2019-12-17 NOTE — ASU DISCHARGE PLAN (ADULT/PEDIATRIC) - ASU DC SPECIAL INSTRUCTIONSFT
Post-Operative Instructions    PRESCRIPTIONS: your post-operative medications have been handed to you or sent to the pharmacy you indicated at your pre-operative visit.  If you have any difficulty obtaining your post-operative medications or have any questions, please call the office at [phone].      PAIN MANAGEMENT: You should expect to have discomfort for the first week or so after surgery. Pain medication should be taken to help alleviate the pain so that you are comfortable and can participate in physical therapy.  Take the medication as directed.  You may decrease the amount of pain medication, as tolerated, when pain improves.  You must exercise caution when operating a motor vehicle. You have been prescribed one or more of the following as indicated on your Med Rec form that the Nurse will go over with you:  [ x ] Oxycodone 5mg 1 tablets by mouth every 4 to 6 hours as needed for pain  Oxycodone is a short-acting pain medication routinely prescribed after surgery.  It is the pain medication found in “Percocet,” which is a combination of oxycodone and Tylenol.  If you were prescribed oxycodone, you may take Tylenol in addition to this medication, if needed for pain control.  [  ] Oxycontin 10mg by mouth every 12 hours for 5 days  Oxycontin is a long-acting pain medication.  It is sometimes prescribed after longer surgeries. If you were prescribed this medication, you should take it twice a day for the first 5 days after surgery to help with pain control.  [  ] Vicodin or Norco (Hydrocodone 5mg/Tylenol 325mg) 1-2 tablets by mouth every 4-6 hours as needed for pain  Vicodin and Norco are short acting pain medications sometimes prescribed after surgery.  If you were prescribed this medication, you may take 1-2 tablets every 4-6 hours as needed for pain.  This medication already contains Tylenol.  You should NOT take any additional Tylenol (acetaminophen) if you are taking these medications.    NAUSEA: Nausea is a common side effect of anesthesia and pain medications.  You may take the below medication if you are experiencing nausea after surgery.  If you continue to experience nausea or vomiting more than 24 hours after surgery, please call the office.  [ x ] Ondansetron 4mg by mouth every 4 hours as needed for nausea    CONSTIPATION: common side effect of anesthesia and pain medications.  You should take Colace three times daily, as long as you are taking narcotic pain medications after surgery, such as oxycodone, oxycontin, vicodin, or norco.  [ x ] Colace 100mg by mouth three times daily    DVT PROPHYLAXIS (PREVENTION OF BLOOD CLOTS): Aspirin EC can reduce the risk of blood clots after surgery, particularly after surgery on the legs, ankles and feet.  If you have been prescribed Aspirn, it is essential that you take this medication.  If you already are on an anticoagulant (blood thinner) such as Xarelto, Coumadin, Warfarin, Eliquis - you should resume you home "blood-thinner" in place of the Aspirn.  [ x ] Aspirin 325mg ENTERIC COATED (EC) by mouth once daily for 4 weeks    ACTIVITY: You should be up and moving as much and as often as possible! Do NOT walk or put bodyweight on your splint or surgical side. Use Crutches or walker. You must keep your bandage/splint dry. Do NOT get it wet. IF you shower it MUST be covered tightly with a garbage bag. Otherwise sponge bath is advised. You do NOT want wet bandages on your skin as they can cause skin breakdown under the splint.    BANDAGE: Your bandage will be changed in the office. Do NOT remove it yourself. IF it gets damaged or wet (soaked) call. Follow up about 7-10 days in office or as otherwise advised by Dr. Mercedes if different.

## 2019-12-17 NOTE — BRIEF OPERATIVE NOTE - NSICDXBRIEFPOSTOP_GEN_ALL_CORE_FT
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

## 2019-12-17 NOTE — BRIEF OPERATIVE NOTE - NSICDXBRIEFPROCEDURE_GEN_ALL_CORE_FT
PROCEDURES:  Removal of hardware from extremity 17-Dec-2019 14:01:09 Right lower extremity with treatment of distal fibula nonunion Radha Espinosa

## 2019-12-17 NOTE — BRIEF OPERATIVE NOTE - NSICDXBRIEFPREOP_GEN_ALL_CORE_FT
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

## 2019-12-22 DIAGNOSIS — T84.84XD PAIN DUE TO INTERNAL ORTHOPEDIC PROSTHETIC DEVICES, IMPLANTS AND GRAFTS, SUBSEQUENT ENCOUNTER: ICD-10-CM

## 2019-12-22 DIAGNOSIS — M25.871 OTHER SPECIFIED JOINT DISORDERS, RIGHT ANKLE AND FOOT: ICD-10-CM

## 2019-12-22 DIAGNOSIS — F41.9 ANXIETY DISORDER, UNSPECIFIED: ICD-10-CM
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
## 2019-12-22 DIAGNOSIS — G47.33 OBSTRUCTIVE SLEEP APNEA (ADULT) (PEDIATRIC): ICD-10-CM

## 2019-12-22 DIAGNOSIS — S82.831K: ICD-10-CM
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
## 2019-12-22 DIAGNOSIS — J45.909 UNSPECIFIED ASTHMA, UNCOMPLICATED: ICD-10-CM

## 2019-12-22 DIAGNOSIS — R01.1 CARDIAC MURMUR, UNSPECIFIED: ICD-10-CM
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
## 2020-01-02 ENCOUNTER — TRANSCRIPTION ENCOUNTER (OUTPATIENT)
Age: 30
End: 2020-01-02

## 2020-01-02 ENCOUNTER — APPOINTMENT (OUTPATIENT)
Dept: ORTHOPEDIC SURGERY | Facility: CLINIC | Age: 30
End: 2020-01-02
Payer: MEDICAID

## 2020-01-02 PROCEDURE — 99024 POSTOP FOLLOW-UP VISIT: CPT

## 2020-01-02 PROCEDURE — 97760 ORTHOTIC MGMT&TRAING 1ST ENC: CPT | Mod: 58

## 2020-01-02 PROCEDURE — 73610 X-RAY EXAM OF ANKLE: CPT | Mod: RT

## 2020-01-02 NOTE — ADDENDUM
[FreeTextEntry1] : I, Werner Thao, acted solely as a scribe for Dr. Raffy Mercedes on this date 01/02/2020 .\par All medical record entries made by the Scribe were at my, Dr. Raffy Mercedes, direction and personally dictated by me on 01/02/2020 . I have reviewed the chart and agree that the record accurately reflects my personal performance of the history, physical exam, assessment and plan. I have also personally directed, reviewed, and agreed with the chart.

## 2020-01-02 NOTE — HISTORY OF PRESENT ILLNESS
[___ Days Post Op] : post op day #[unfilled] [Clean/Dry/Intact] : clean, dry and intact [Healed] : healed [Negative Marco's] : maneuvers demonstrated a negative Marco's sign [Vascular Intact] : ~T peripheral vascular exam normal [Neuro Intact] : an unremarkable neurological exam [Doing Well] : is doing well [Excellent Pain Control] : has excellent pain control [No Sign of Infection] : is showing no signs of infection [Staples Removed] : staples were removed [Chills] : no chills [Fever] : no fever [Vomiting] : no vomiting [Nausea] : no nausea [Erythema] : not erythematous [Discharge] : absent of discharge [de-identified] : s/p Revision of right distal fibular nonunion, removal of hardware right ankle.\par DOS 12/17/19 [Swelling] : not swollen [Dehiscence] : not dehisced [de-identified] : General: Alert and oriented x3. In no acute distress. Pleasant in nature with a normal affect. No apparent respiratory distress.\par The wound is intact. no evidence of any diastases or dehiscence. No surrounding erythema noted. No fluctuance. The area is warm and well perfused. The patient is able to wiggle their toes. Neurovascularly intact.  [de-identified] : 29 year old female present in the office 16 days post op from Revision of right distal fibular nonunion, removal of hardware right ankle. The patient's pain is noted to be a 3/10. The pain is noted to be the same, and the swelling is noted to be worse compared to the previous visit. The patient presents ambulating in crutches. She is not currently taking any pain medication. No other complaints at this time.  [de-identified] : 3V of the right ankle were ordered obtained and reviewed by me today, 01/02/2020 , revealed: retained fractured screw [de-identified] : Patient is a 29 year year old female present in the office today 16 days s/p Revision of right distal fibular nonunion, removal of hardware right ankle. I recommend that the patient utilize a CAM boot. The patient was fitted with a CAM boot in the office today. The patient was educated about the boot wear pattern and utilization, as well as the timeframe to come out of the boot. She was also given full instructions for using the boot. I advised the patient to continue taking vitamin D. I recommend that the patient utilize a bone stimulator. She should use the bone stimulator 2 times per day, 20 minutes each time. The staples were removed in the office today. I would like to see the patient back in the office in 1 month to reassess their condition. I have addressed all the patient's concerns surrounding the pathology of their condition. The patient understood and verbally agreed to the treatment plan. All of their questions were answered and they were satisfied with the visit. The patient should call the office if they have any questions or experience worsening symptoms. \par \par Boot \par staples\par asa\par nwb\par ROM\par f/u 3-4 weeks.

## 2020-01-27 ENCOUNTER — APPOINTMENT (OUTPATIENT)
Dept: ORTHOPEDIC SURGERY | Facility: CLINIC | Age: 30
End: 2020-01-27
Payer: MEDICAID

## 2020-01-27 PROCEDURE — 99024 POSTOP FOLLOW-UP VISIT: CPT

## 2020-01-27 PROCEDURE — 73610 X-RAY EXAM OF ANKLE: CPT | Mod: RT

## 2020-01-27 NOTE — ADDENDUM
[FreeTextEntry1] : I, Arcadio Navarro, acted solely as a scribe for Dr. Raffy Mercedes on this date 01/27/2020. \par \par All medical record entries made by the Scribe were at my, Dr. Raffy Mercedes, direction and personally dictated by me on 01/27/2020 . I have reviewed the chart and agree that the record accurately reflects my personal performance of the history, physical exam, assessment and plan. I have also personally directed, reviewed, and agreed with the chart.

## 2020-01-27 NOTE — HISTORY OF PRESENT ILLNESS
[___ Weeks Post Op] : [unfilled] weeks post op [Clean/Dry/Intact] : clean, dry and intact [Healed] : healed [Neuro Intact] : an unremarkable neurological exam [Vascular Intact] : ~T peripheral vascular exam normal [Negative Marco's] : maneuvers demonstrated a negative Marco's sign [Doing Well] : is doing well [Excellent Pain Control] : has excellent pain control [No Sign of Infection] : is showing no signs of infection [Chills] : no chills [Fever] : no fever [Nausea] : no nausea [Vomiting] : no vomiting [Erythema] : not erythematous [Discharge] : absent of discharge [Swelling] : not swollen [de-identified] : s/p Revision of right distal fibular nonunion, removal of hardware right ankle.\par DOS: 12/17/19 [de-identified] : 29 year old female present in the office 6 weeks post op from Revision of right distal fibular nonunion, removal of hardware right ankle. The patient's pain is noted to be a 2/10. The pain and swelling are both noted to be 80% improved compared to the previous visit. The patient has been taking aspirin for blood thinning purposes. The patient presents wearing a CAM boot and ambulating with crutches. She reports that she has been using the bone stimulator occasionally, and not as often as she should. No other complaints at this time. [de-identified] : General: Alert and oriented x3. In no acute distress. Pleasant in nature with a normal affect. No apparent respiratory distress. The wound is intact. No evidence of any diastases or dehiscence. No surrounding erythema noted. No fluctuance. The area is warm and well perfused. The patient is able to wiggle their toes. Neurovascularly intact. [de-identified] : 3V of the right ankle were ordered obtained and reviewed by me today, 01/27/2020, revealed: small callous formation noted [de-identified] : Patient is a 29 year old female present in the office today 6 weeks s/p Revision of right distal fibular nonunion, removal of hardware right ankle. I recommend the patient undergo a course of physical therapy for the right ankle 2-3 times a week for a total of 6-8 weeks. A prescription was given for the physical therapy today. I recommend that the patient wean out of their CAM boot and into an ASO brace with the assistance of physical therapy as tolerated. The patient was provided with the ASO brace in the office today. The patient should begin slowly weaning off of the crutches and begin weight bearing as tolerated. I advised the patient to continue using the bone stimulator for 20 minutes twice per day. I would like to see the patient back in the office in 1 month to reassess her condition. I have addressed all the patient's concerns surrounding the pathology of their condition. The patient understood and verbally agreed to the treatment plan. All of their questions were answered and they were satisfied with the visit. The patient should call the office if they have any questions or experience worsening symptoms.

## 2020-02-27 ENCOUNTER — APPOINTMENT (OUTPATIENT)
Dept: ORTHOPEDIC SURGERY | Facility: CLINIC | Age: 30
End: 2020-02-27
Payer: MEDICAID

## 2020-02-27 PROCEDURE — 99024 POSTOP FOLLOW-UP VISIT: CPT

## 2020-02-27 PROCEDURE — 73610 X-RAY EXAM OF ANKLE: CPT | Mod: RT

## 2020-02-27 NOTE — HISTORY OF PRESENT ILLNESS
[___ Weeks Post Op] : [unfilled] weeks post op [Clean/Dry/Intact] : clean, dry and intact [Healed] : healed [Neuro Intact] : an unremarkable neurological exam [Vascular Intact] : ~T peripheral vascular exam normal [Negative Marco's] : maneuvers demonstrated a negative Marco's sign [Doing Well] : is doing well [No Sign of Infection] : is showing no signs of infection [Excellent Pain Control] : has excellent pain control [Chills] : no chills [Fever] : no fever [Nausea] : no nausea [Vomiting] : no vomiting [Erythema] : not erythematous [Discharge] : absent of discharge [Swelling] : not swollen [Dehiscence] : not dehisced [de-identified] : s/p Revision of right distal fibular nonunion, removal of hardware right ankle.\par DOS: 12/17/19.  [de-identified] : 29 year old female present in the office 10 weeks post op from Revision of right distal fibular nonunion, removal of hardware right ankle. The patient's pain is noted to be a 3/10. Her pain and swelling are noted to be 80% improved compared to the previous visit. She notes she is having some pain in her back and ankle with prolonged standing. The patient has been attending physical therapy for this issue. She is currently taking Advil as needed. She is taking Vitamin D and utilizing a bone stimulator. No other complaints at this time.  [de-identified] : General: Alert and oriented x3. In no acute distress. Pleasant in nature with a normal affect. No apparent respiratory distress.\par The wound is intact. No evidence of any diastases or dehiscence. No surrounding erythema noted. No fluctuance. The area is warm and well perfused. The patient is able to wiggle their toes. Neurovascularly intact. \par \par R Ankle\par ROM: 10 degrees of dorsiflexion, 40 degrees of plantarflexion, 10 degrees of subtalar motion. [de-identified] : 3V of the right ankle were ordered obtained and reviewed by me today, 02/27/2020 , revealed: broken screw noted, ankle is stable, healing noted [de-identified] : Patient is a 29 year old female present in the office today 10 weeks s/p Revision of right distal fibular nonunion, removal of hardware right ankle. I recommend that the patient utilize a bone stimulator. The patient was provided with a bone stimulator and received instructions on how to operate the device as well as education about the devices functions. She should use the bone stimulator 2 times per day, 20 minutes each time. I recommend that the patient utilize 50,000 units of vitamin D. A prescription was given in the office today. 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  I would like to see the patient back in the office in 4-6 to reassess their condition and obtain new x-rays. I have addressed all the patient's concerns surrounding the pathology of their condition. The patient understood and verbally agreed to the treatment plan. All of their questions were answered and they were satisfied with the visit. The patient should call the office if they have any questions or experience worsening symptoms.

## 2020-02-27 NOTE — ADDENDUM
[FreeTextEntry1] : I, Werner Thao, acted solely as a scribe for Dr. Raffy Mercedes on this date 02/27/2020 .\par All medical record entries made by the Scribe were at my, Dr. Raffy Mercedes, direction and personally dictated by me on 02/27/2020 . I have reviewed the chart and agree that the record accurately reflects my personal performance of the history, physical exam, assessment and plan. I have also personally directed, reviewed, and agreed with the chart.

## 2020-03-26 ENCOUNTER — APPOINTMENT (OUTPATIENT)
Dept: ORTHOPEDIC SURGERY | Facility: CLINIC | Age: 30
End: 2020-03-26

## 2020-08-05 ENCOUNTER — APPOINTMENT (OUTPATIENT)
Dept: ORTHOPEDIC SURGERY | Facility: CLINIC | Age: 30
End: 2020-08-05

## 2020-08-14 ENCOUNTER — EMERGENCY (EMERGENCY)
Facility: HOSPITAL | Age: 30
LOS: 0 days | Discharge: HOME | End: 2020-08-14
Attending: EMERGENCY MEDICINE
Payer: MEDICAID

## 2020-08-14 VITALS
WEIGHT: 240.08 LBS | HEIGHT: 65 IN | OXYGEN SATURATION: 98 % | HEART RATE: 76 BPM | RESPIRATION RATE: 16 BRPM | TEMPERATURE: 98 F | DIASTOLIC BLOOD PRESSURE: 73 MMHG | SYSTOLIC BLOOD PRESSURE: 118 MMHG

## 2020-08-14 VITALS
SYSTOLIC BLOOD PRESSURE: 119 MMHG | RESPIRATION RATE: 17 BRPM | TEMPERATURE: 98 F | DIASTOLIC BLOOD PRESSURE: 90 MMHG | HEART RATE: 98 BPM | OXYGEN SATURATION: 98 %

## 2020-08-14 DIAGNOSIS — R01.1 CARDIAC MURMUR, UNSPECIFIED: ICD-10-CM

## 2020-08-14 DIAGNOSIS — K92.0 HEMATEMESIS: ICD-10-CM

## 2020-08-14 DIAGNOSIS — Z98.890 OTHER SPECIFIED POSTPROCEDURAL STATES: Chronic | ICD-10-CM

## 2020-08-14 DIAGNOSIS — R07.89 OTHER CHEST PAIN: ICD-10-CM

## 2020-08-14 DIAGNOSIS — R11.2 NAUSEA WITH VOMITING, UNSPECIFIED: ICD-10-CM

## 2020-08-14 DIAGNOSIS — K21.9 GASTRO-ESOPHAGEAL REFLUX DISEASE WITHOUT ESOPHAGITIS: ICD-10-CM

## 2020-08-14 DIAGNOSIS — G47.33 OBSTRUCTIVE SLEEP APNEA (ADULT) (PEDIATRIC): ICD-10-CM

## 2020-08-14 DIAGNOSIS — F17.200 NICOTINE DEPENDENCE, UNSPECIFIED, UNCOMPLICATED: ICD-10-CM

## 2020-08-14 DIAGNOSIS — F32.9 MAJOR DEPRESSIVE DISORDER, SINGLE EPISODE, UNSPECIFIED: ICD-10-CM

## 2020-08-14 DIAGNOSIS — J45.909 UNSPECIFIED ASTHMA, UNCOMPLICATED: ICD-10-CM

## 2020-08-14 LAB
ALBUMIN SERPL ELPH-MCNC: 3.7 G/DL — SIGNIFICANT CHANGE UP (ref 3.3–5)
ALP SERPL-CCNC: 102 U/L — SIGNIFICANT CHANGE UP (ref 40–120)
ALT FLD-CCNC: 20 U/L — SIGNIFICANT CHANGE UP (ref 12–78)
ANION GAP SERPL CALC-SCNC: 8 MMOL/L — SIGNIFICANT CHANGE UP (ref 5–17)
AST SERPL-CCNC: 16 U/L — SIGNIFICANT CHANGE UP (ref 15–37)
BASOPHILS # BLD AUTO: 0.07 K/UL — SIGNIFICANT CHANGE UP (ref 0–0.2)
BASOPHILS NFR BLD AUTO: 0.7 % — SIGNIFICANT CHANGE UP (ref 0–2)
BILIRUB SERPL-MCNC: 0.4 MG/DL — SIGNIFICANT CHANGE UP (ref 0.2–1.2)
BUN SERPL-MCNC: 11 MG/DL — SIGNIFICANT CHANGE UP (ref 7–23)
CALCIUM SERPL-MCNC: 9.1 MG/DL — SIGNIFICANT CHANGE UP (ref 8.5–10.1)
CHLORIDE SERPL-SCNC: 107 MMOL/L — SIGNIFICANT CHANGE UP (ref 96–108)
CO2 SERPL-SCNC: 26 MMOL/L — SIGNIFICANT CHANGE UP (ref 22–31)
CREAT SERPL-MCNC: 1.09 MG/DL — SIGNIFICANT CHANGE UP (ref 0.5–1.3)
EOSINOPHIL # BLD AUTO: 0.23 K/UL — SIGNIFICANT CHANGE UP (ref 0–0.5)
EOSINOPHIL NFR BLD AUTO: 2.2 % — SIGNIFICANT CHANGE UP (ref 0–6)
GLUCOSE SERPL-MCNC: 93 MG/DL — SIGNIFICANT CHANGE UP (ref 70–99)
HCG SERPL-ACNC: <1 MIU/ML — SIGNIFICANT CHANGE UP
HCT VFR BLD CALC: 41.7 % — SIGNIFICANT CHANGE UP (ref 34.5–45)
HGB BLD-MCNC: 13.9 G/DL — SIGNIFICANT CHANGE UP (ref 11.5–15.5)
IMM GRANULOCYTES NFR BLD AUTO: 0.3 % — SIGNIFICANT CHANGE UP (ref 0–1.5)
LIDOCAIN IGE QN: 116 U/L — SIGNIFICANT CHANGE UP (ref 73–393)
LYMPHOCYTES # BLD AUTO: 2.59 K/UL — SIGNIFICANT CHANGE UP (ref 1–3.3)
LYMPHOCYTES # BLD AUTO: 24.3 % — SIGNIFICANT CHANGE UP (ref 13–44)
MCHC RBC-ENTMCNC: 29.6 PG — SIGNIFICANT CHANGE UP (ref 27–34)
MCHC RBC-ENTMCNC: 33.3 GM/DL — SIGNIFICANT CHANGE UP (ref 32–36)
MCV RBC AUTO: 88.7 FL — SIGNIFICANT CHANGE UP (ref 80–100)
MONOCYTES # BLD AUTO: 0.64 K/UL — SIGNIFICANT CHANGE UP (ref 0–0.9)
MONOCYTES NFR BLD AUTO: 6 % — SIGNIFICANT CHANGE UP (ref 2–14)
NEUTROPHILS # BLD AUTO: 7.12 K/UL — SIGNIFICANT CHANGE UP (ref 1.8–7.4)
NEUTROPHILS NFR BLD AUTO: 66.5 % — SIGNIFICANT CHANGE UP (ref 43–77)
PLATELET # BLD AUTO: 306 K/UL — SIGNIFICANT CHANGE UP (ref 150–400)
POTASSIUM SERPL-MCNC: 3.3 MMOL/L — LOW (ref 3.5–5.3)
POTASSIUM SERPL-SCNC: 3.3 MMOL/L — LOW (ref 3.5–5.3)
PROT SERPL-MCNC: 7.8 GM/DL — SIGNIFICANT CHANGE UP (ref 6–8.3)
RBC # BLD: 4.7 M/UL — SIGNIFICANT CHANGE UP (ref 3.8–5.2)
RBC # FLD: 13 % — SIGNIFICANT CHANGE UP (ref 10.3–14.5)
SODIUM SERPL-SCNC: 141 MMOL/L — SIGNIFICANT CHANGE UP (ref 135–145)
WBC # BLD: 10.68 K/UL — HIGH (ref 3.8–10.5)
WBC # FLD AUTO: 10.68 K/UL — HIGH (ref 3.8–10.5)

## 2020-08-14 PROCEDURE — 36415 COLL VENOUS BLD VENIPUNCTURE: CPT

## 2020-08-14 PROCEDURE — 96374 THER/PROPH/DIAG INJ IV PUSH: CPT

## 2020-08-14 PROCEDURE — 80053 COMPREHEN METABOLIC PANEL: CPT

## 2020-08-14 PROCEDURE — 96375 TX/PRO/DX INJ NEW DRUG ADDON: CPT

## 2020-08-14 PROCEDURE — 99285 EMERGENCY DEPT VISIT HI MDM: CPT

## 2020-08-14 PROCEDURE — 83690 ASSAY OF LIPASE: CPT

## 2020-08-14 PROCEDURE — 99284 EMERGENCY DEPT VISIT MOD MDM: CPT | Mod: 25

## 2020-08-14 PROCEDURE — 71046 X-RAY EXAM CHEST 2 VIEWS: CPT

## 2020-08-14 PROCEDURE — 93010 ELECTROCARDIOGRAM REPORT: CPT

## 2020-08-14 PROCEDURE — 85025 COMPLETE CBC W/AUTO DIFF WBC: CPT

## 2020-08-14 PROCEDURE — 71046 X-RAY EXAM CHEST 2 VIEWS: CPT | Mod: 26

## 2020-08-14 PROCEDURE — 93005 ELECTROCARDIOGRAM TRACING: CPT

## 2020-08-14 PROCEDURE — 84702 CHORIONIC GONADOTROPIN TEST: CPT

## 2020-08-14 RX ORDER — FAMOTIDINE 10 MG/ML
1 INJECTION INTRAVENOUS
Qty: 7 | Refills: 0
Start: 2020-08-14 | End: 2020-08-20

## 2020-08-14 RX ORDER — ONDANSETRON 8 MG/1
4 TABLET, FILM COATED ORAL ONCE
Refills: 0 | Status: COMPLETED | OUTPATIENT
Start: 2020-08-14 | End: 2020-08-14

## 2020-08-14 RX ORDER — FAMOTIDINE 10 MG/ML
20 INJECTION INTRAVENOUS ONCE
Refills: 0 | Status: COMPLETED | OUTPATIENT
Start: 2020-08-14 | End: 2020-08-14

## 2020-08-14 RX ORDER — SODIUM CHLORIDE 9 MG/ML
1000 INJECTION INTRAMUSCULAR; INTRAVENOUS; SUBCUTANEOUS ONCE
Refills: 0 | Status: COMPLETED | OUTPATIENT
Start: 2020-08-14 | End: 2020-08-14

## 2020-08-14 RX ORDER — POTASSIUM CHLORIDE 20 MEQ
40 PACKET (EA) ORAL ONCE
Refills: 0 | Status: COMPLETED | OUTPATIENT
Start: 2020-08-14 | End: 2020-08-14

## 2020-08-14 RX ADMIN — SODIUM CHLORIDE 1000 MILLILITER(S): 9 INJECTION INTRAMUSCULAR; INTRAVENOUS; SUBCUTANEOUS at 22:03

## 2020-08-14 RX ADMIN — Medication 40 MILLIEQUIVALENT(S): at 22:03

## 2020-08-14 RX ADMIN — FAMOTIDINE 20 MILLIGRAM(S): 10 INJECTION INTRAVENOUS at 22:03

## 2020-08-14 RX ADMIN — ONDANSETRON 4 MILLIGRAM(S): 8 TABLET, FILM COATED ORAL at 22:03

## 2020-08-14 NOTE — ED STATDOCS - CLINICAL SUMMARY MEDICAL DECISION MAKING FREE TEXT BOX
Acute on chronic n/v and chest pain. New onset hematemesis, likely due to recent EGD. will check H&H, do not suspect esophogeal rupture, symptomatic treatment, no DVT or PE risk factors, no hemoptysis. Acute on chronic n/v and chest pain possibly from gastritis vs cyclical vomitting syndrome from marijuana. New onset hematemesis, likely due to recent EGD. will check H&H, do not suspect esophogeal rupture, symptomatic treatment, no DVT or PE risk factors, no hemoptysis.

## 2020-08-14 NOTE — ED STATDOCS - PATIENT PORTAL LINK FT
You can access the FollowMyHealth Patient Portal offered by [facility] by registering at the following website: [email or web address]. By joining [employer]’s FollowMyHealth portal, you will also be able to view your health information using other applications (apps) compatible with our system.

## 2020-08-14 NOTE — ED STATDOCS - PROGRESS NOTE DETAILS
Patient seen and evaluated in intake with ED Attending,  ED attending note and orders reviewed, will continue with patient follow up and care -Shannan Faustin PA-C pt reeval and educated on stop smoking. pt awaiting labs and for po challenge, will reeval. -Shannan Faustin PA-C

## 2020-08-14 NOTE — ED STATDOCS - ATTENDING CONTRIBUTION TO CARE
I, Rl Aj MD,  performed the initial face to face bedside interview with this patient regarding history of present illness, review of symptoms and relevant past medical, social and family history.  I completed an independent physical examination.  I was the initial provider who evaluated this patient. I have signed out the follow up of any pending tests (i.e. labs, radiological studies) to the ACP.  The ACP will complete the work up, interpret tests, administer medications if necessary and reassess the patient for an appropriate disposition.  If questions arise the ACP is expected to contact me for consultation. In the current work-flow I usually don't get to speak to nor reassess patients for final disposition once I sign the case out to the ACP (Unless otherwise documented).

## 2020-08-14 NOTE — ED STATDOCS - CARE PLAN
Principal Discharge DX:	Hematemesis, presence of nausea not specified  Secondary Diagnosis:	Gastroesophageal reflux disease without esophagitis

## 2020-08-14 NOTE — ED STATDOCS - NS ED ROS FT
Review of Systems:  	•	CONSTITUTIONAL: no fever  	•	SKIN: no rash  	•	RESPIRATORY: no shortness of breath  	•	CARDIAC: + chest pain no palpitations  	•	GI:  + N/V, hematemesis  	•	GENITO-URINARY:  no dysuria; no hematuria    	•	MUSCULOSKELETAL:  no back pain  	•	NEUROLOGIC: no weakness  	•	ALLERGY: no rhinitis  	•	PSYSCHIATRIC: no anxiety

## 2020-08-14 NOTE — ED STATDOCS - OBJECTIVE STATEMENT
Pertinent HPI/PMH/PSH/FHx/SHx and Review of Systems contained within  HPI:  Patient p/w CC chest pain, n/v, hematemesis x 1 day. Pt states that she started to have chest pain today, afterwards she started to feel nauseous and then vomited blood. Pt became concerned since she has never had hematemesis in the past. Pt states that she had an endoscopy on Monday. Got the endoscopy because she was feeling chest pain and throwing up. States that today was the first day she threw up since the endoscopy. Pt does not take any blood thinners. Currently still has chest pain and feels nauseous. Smoker.   PMH/PSH relevant for: Asthma, Depression  ROS negative for: fever, SOB, diarrhea, abdominal pain, dysuria    FamilyHx and SocialHx not otherwise contributory  GI- Dr. Aparicio Pertinent HPI/PMH/PSH/FHx/SHx and Review of Systems contained within  HPI:  Patient p/w CC chest pain, n/v, hematemesis x 1 day. Pt states that she started to have chest pain today, afterwards she started to feel nauseous and then vomited blood. Pt became concerned since she has never had hematemesis in the past. Pt states that she had an endoscopy on Monday to evaluate chronic chest pain and n/v. States that today was the first day she threw up since the endoscopy. Pt does not take any blood thinners. . smokes marijuana. no heavy etoh use or liver problems  PMH/PSH relevant for: Asthma, Depression  ROS negative for: fever, SOB, diarrhea, abdominal pain, dysuria    FamilyHx and SocialHx not otherwise contributory  GI- Dr. Aparicio

## 2020-08-14 NOTE — ED ADULT NURSE NOTE - NS_NURSE_DISC_TEACHING_YN_ED_ALL_ED
Problem: Pediatric High Fall Risk  Goal: Absence of falls  Outcome: Ongoing  Side rails up x 2 and call light in reach. Yes

## 2020-08-14 NOTE — ED STATDOCS - CARE PROVIDER_API CALL
Dora Maier  GASTROENTEROLOGY  [address] [place] [address]  Phone: [phone]  Fax: [phone]  Follow Up Time:

## 2020-08-14 NOTE — ED ADULT TRIAGE NOTE - CHIEF COMPLAINT QUOTE
Ambulatory s/p endoscopy 8/10 for feeling a postprandial food bolus was stuck in her esophagus/chest, findings unknown at this time, complaining of hematemesis today with associated dizziness. Denies abdominal pain or nausea.

## 2020-08-17 ENCOUNTER — APPOINTMENT (OUTPATIENT)
Dept: ORTHOPEDIC SURGERY | Facility: CLINIC | Age: 30
End: 2020-08-17
Payer: MEDICAID

## 2020-08-17 PROCEDURE — 73610 X-RAY EXAM OF ANKLE: CPT | Mod: RT

## 2020-08-17 PROCEDURE — 99213 OFFICE O/P EST LOW 20 MIN: CPT

## 2020-08-17 NOTE — PHYSICAL EXAM
[de-identified] : General: Alert and oriented x3. In no acute distress. Pleasant in nature with a normal affect. No apparent respiratory distress.\par \par R Ankle Exam\par Skin: Clean, dry, intact\par Inspection: No obvious malalignment, no swelling, no effusion; no lymphadenopathy\par Pulses: 2+ DP/PT pulses\par ROM: R Ankle 10 degrees of dorsiflexion, 40 degrees of plantarflexion, 10 degrees of subtalar motion\par Tenderness: No tenderness over the lateral malleolus, no CFL/ATFL/PTFL pain. No medial malleolus pain, no deltoid ligament pain. No proximal fibular pain. No heel pain.\par Stability: Negative anterior/posterior drawer.\par Strength: 5/5 TA/GS/EHL\par Neuro: In tact to light touch throughout\par Additional tests: Negative Mortons test, Negative syndesmosis squeeze test.  [de-identified] : 3V of the the right ankle were ordered obtained and reviewed by me today, 08/17/2020 , revealed: Good overall alignment.

## 2020-08-17 NOTE — ADDENDUM
[FreeTextEntry1] : I, Werner Thao, acted solely as a scribe for Dr. Raffy Mercedes on this date 08/17/2020 .\par All medical record entries made by the Scribe were at my, Dr. Raffy Mercedes, direction and personally dictated by me on 08/17/2020 . I have reviewed the chart and agree that the record accurately reflects my personal performance of the history, physical exam, assessment and plan. I have also personally directed, reviewed, and agreed with the chart.

## 2020-08-17 NOTE — HISTORY OF PRESENT ILLNESS
[FreeTextEntry1] : 30 year old female presenting with right ankle pain. The patient’s pain is noted to be a 4/10. The pain and swelling are noted to be worse compared to the previous visit. She notes her pain has worsened since June 2020. She reports clicking in the ankle and Achilles pain. She also reports a lack of strength in the ankle, and her ankle gave out on her when she got out of bed one day. She is currently taking aspirin PRN. No other complaints at this time.

## 2020-08-17 NOTE — DISCUSSION/SUMMARY
[de-identified] : Today I had a lengthy discussion with the patient regarding their right ankle pain. I have addressed all the patient's concerns surrounding the pathology of their condition. A discussion was had about a possible cortisone injection vs conservative management. At this time I would like to obtain advanced imaging of the patient's right ankle. An MRI was ordered so I can find out more about the etiology of the patient's condition. The patient should follow up with the office after obtaining the MRI. The patient understood and verbally agreed to the treatment plan. All of their questions were answered and they were satisfied with the visit. The patient should call the office if they have any questions or experience worsening symptoms.

## 2020-08-28 ENCOUNTER — OUTPATIENT (OUTPATIENT)
Dept: OUTPATIENT SERVICES | Facility: HOSPITAL | Age: 30
LOS: 1 days | End: 2020-08-28

## 2020-08-28 ENCOUNTER — APPOINTMENT (OUTPATIENT)
Dept: MRI IMAGING | Facility: CLINIC | Age: 30
End: 2020-08-28
Payer: MEDICAID

## 2020-08-28 DIAGNOSIS — Z98.890 OTHER SPECIFIED POSTPROCEDURAL STATES: Chronic | ICD-10-CM

## 2020-08-28 DIAGNOSIS — Z00.8 ENCOUNTER FOR OTHER GENERAL EXAMINATION: ICD-10-CM

## 2020-08-28 PROCEDURE — 73721 MRI JNT OF LWR EXTRE W/O DYE: CPT | Mod: 26,RT

## 2020-09-10 ENCOUNTER — APPOINTMENT (OUTPATIENT)
Dept: ORTHOPEDIC SURGERY | Facility: CLINIC | Age: 30
End: 2020-09-10
Payer: MEDICAID

## 2020-09-10 PROCEDURE — 99214 OFFICE O/P EST MOD 30 MIN: CPT

## 2020-09-10 NOTE — PHYSICAL EXAM
[de-identified] : General: Alert and oriented x3. In no acute distress. Pleasant in nature with a normal affect. No apparent respiratory distress.\par \par R Ankle Exam\par Skin: Clean, dry, intact\par Inspection: No obvious malalignment, no swelling, no effusion; no lymphadenopathy\par Pulses: 2+ DP/PT pulses\par ROM: R Ankle 10 degrees of dorsiflexion, 40 degrees of plantarflexion, 10 degrees of subtalar motion\par Tenderness: +peroneals. No tenderness over the lateral malleolus, no CFL/ATFL/PTFL pain. No medial malleolus pain, no deltoid ligament pain. No proximal fibular pain. No heel pain.\par Stability: Negative anterior/posterior drawer.\par Strength: 5/5 TA/GS/EHL\par Neuro: In tact to light touch throughout\par Additional tests: Negative Mortons test, Negative syndesmosis squeeze test.  [de-identified] : An MRI was obtained of the right ankle from an outside facility on 8/28/2020 and reviewed by me today 09/10/2020  in the office. Revealed: \par 1. Chondral loss with subchondral marrow edema and cystic changes involving an area that measures 9 x 5 mm along the medial aspect of the talar dome consistent with early posttraumatic osteoarthritis. \par 2. Normal appearance of the Achilles tendon. Trace fluid in the retrocalcaneal bursa. \par 3. Scattered sequelae of prior ankle fracture/dislocation, as described above.

## 2020-09-10 NOTE — DISCUSSION/SUMMARY
[de-identified] : Today I had a lengthy discussion with the patient regarding their right ankle pain. I have addressed all the patient's concerns surrounding the pathology of their condition. In order to provide the patient with a better understanding of their ailment, I educated them about the anatomy, physiology and lifespan of their condition using a foot model. The MRI results were reviewed with the patient today. I recommend the patient undergo a course of physical therapy for the right ankle 2-3 times a week for a total of 6-8 weeks. A prescription was given for the physical therapy today. I recommended the patient utilize an ASO brace. The patient was provided with the ASO brace in the office today. I advised the patient to continue with the ibuprofen. A discussion was had about a possible cortisone injection in the future as well if there is no improvement. I would like to see the patient back in the office in 6 weeks to reassess their condition. The patient understood and verbally agreed to the treatment plan. All of their questions were answered and they were satisfied with the visit. The patient should call the office if they have any questions or experience worsening symptoms.\par \par Post-traumatic OA\par Medial talus OCD\par Peroneal pain\par Instability\par Recommend PT\par F/U 6-8 weeks.

## 2020-09-10 NOTE — HISTORY OF PRESENT ILLNESS
[FreeTextEntry1] : 30 year old female presenting with right ankle pain and instability. The patient’s pain is noted to be a 4/10. The pain and swelling are noted to be the same compared to the previous visit. She notes increased pain with walking and standing. She presents today to review her MRI results.She is currently taking Advil as needed. The patient also reports an increased feeling of depression since her injury.  No other complaints at this time.

## 2020-09-10 NOTE — ADDENDUM
[FreeTextEntry1] : I, Werner Thao, acted solely as a scribe for Dr. Raffy Mercedes on this date 09/10/2020 .\par All medical record entries made by the Scribe were at my, Dr. Raffy Mercedes, direction and personally dictated by me on 09/10/2020 . I have reviewed the chart and agree that the record accurately reflects my personal performance of the history, physical exam, assessment and plan. I have also personally directed, reviewed, and agreed with the chart.

## 2020-10-22 ENCOUNTER — APPOINTMENT (OUTPATIENT)
Dept: ORTHOPEDIC SURGERY | Facility: CLINIC | Age: 30
End: 2020-10-22
Payer: MEDICAID

## 2020-10-22 PROCEDURE — 73610 X-RAY EXAM OF ANKLE: CPT | Mod: RT

## 2020-10-22 PROCEDURE — 99213 OFFICE O/P EST LOW 20 MIN: CPT

## 2020-10-22 PROCEDURE — 99072 ADDL SUPL MATRL&STAF TM PHE: CPT

## 2020-10-22 NOTE — ADDENDUM
[FreeTextEntry1] : I, Werner Thao, acted solely as a scribe for Dr. Raffy Mercedes on this date 10/22/2020 .\par All medical record entries made by the Scribe were at my, Dr. Raffy Mercedes, direction and personally dictated by me on 10/22/2020 . I have reviewed the chart and agree that the record accurately reflects my personal performance of the history, physical exam, assessment and plan. I have also personally directed, reviewed, and agreed with the chart.

## 2020-10-22 NOTE — PHYSICAL EXAM
[de-identified] : General: Alert and oriented x3. In no acute distress. Pleasant in nature with a normal affect. No apparent respiratory distress.\par \par R Ankle Exam\par Skin: Clean, dry, intact\par Inspection: No obvious malalignment, Improved swelling, no effusion; no lymphadenopathy\par Pulses: 2+ DP/PT pulses\par ROM: R Ankle 10 degrees of dorsiflexion, 40 degrees of plantarflexion, 10 degrees of subtalar motion\par Tenderness: No pain over peroneals. No tenderness over the lateral malleolus, no CFL/ATFL/PTFL pain. No medial malleolus pain, no deltoid ligament pain. No proximal fibular pain. No heel pain.\par Stability: Negative anterior/posterior drawer.\par Strength: 5/5 TA/GS/EHL\par Neuro: In tact to light touch throughout\par Additional tests: Negative Mortons test, Negative syndesmosis squeeze test.  [de-identified] : 3V of the right ankle were ordered obtained and reviewed by me today, 10/22/2020 , revealed: Distal fibula fracture seen. Posttraumatic osteoarthritis.

## 2020-10-22 NOTE — HISTORY OF PRESENT ILLNESS
[FreeTextEntry1] : 30 year old female presenting with right ankle pain. The patient’s pain is noted to be a 4/10. The pain and swelling are noted to be the same compared to the previous visit. She is currently taking Advil as needed. No other complaints at this time.

## 2021-01-04 ENCOUNTER — APPOINTMENT (OUTPATIENT)
Dept: ORTHOPEDIC SURGERY | Facility: CLINIC | Age: 31
End: 2021-01-04

## 2021-01-04 ENCOUNTER — APPOINTMENT (OUTPATIENT)
Dept: ORTHOPEDIC SURGERY | Facility: CLINIC | Age: 31
End: 2021-01-04
Payer: MEDICAID

## 2021-01-04 DIAGNOSIS — S82.831K OTHER FRACTURE OF UPPER AND LOWER END OF RIGHT FIBULA, SUBSEQUENT ENCOUNTER FOR CLOSED FRACTURE WITH NONUNION: ICD-10-CM

## 2021-01-04 DIAGNOSIS — S82.841A DISPLACED BIMALLEOLAR FRACTURE OF RIGHT LOWER LEG, INITIAL ENCOUNTER FOR CLOSED FRACTURE: ICD-10-CM

## 2021-01-04 PROCEDURE — 99213 OFFICE O/P EST LOW 20 MIN: CPT

## 2021-01-04 PROCEDURE — 73610 X-RAY EXAM OF ANKLE: CPT | Mod: RT

## 2021-01-04 PROCEDURE — 99072 ADDL SUPL MATRL&STAF TM PHE: CPT

## 2021-01-04 NOTE — ADDENDUM
[FreeTextEntry1] : I, Werner Thao, acted solely as a scribe for Dr. Raffy Mercedes on this date 01/04/2021 .\par All medical record entries made by the Scribe were at my, Dr. Raffy Mercedes, direction and personally dictated by me on 01/04/2021 . I have reviewed the chart and agree that the record accurately reflects my personal performance of the history, physical exam, assessment and plan. I have also personally directed, reviewed, and agreed with the chart.

## 2021-01-04 NOTE — DISCUSSION/SUMMARY
[de-identified] : Today I had a lengthy discussion with the patient regarding their right ankle pain. I have addressed all the patient's concerns surrounding the pathology of their condition. I would like to see the patient back in the office in 4-6 months to reassess their condition for surveillance and obtain new x-rays. The patient understood and verbally agreed to the treatment plan. All of their questions were answered and they were satisfied with the visit. The patient should call the office if they have any questions or experience worsening symptoms. \par \par Bioventus gel recall discussed\par Wound was closed when the gel was in use. \par Healing noted\par progression discussed\par Surveillance every 4-6 months.

## 2021-01-04 NOTE — HISTORY OF PRESENT ILLNESS
[FreeTextEntry1] : 30 year old female presenting with right ankle pain. The patient’s pain is noted to be a 5/10. The pain is noted to be worse over the last few days. The patient notes she was utilizing a bone stimulator, but notes the ultrasound gel she was using was contaminated as per the letter she received from [employer]. She notes having shooting pain on the lateral side of the ankle that shoots down to the foot. She feels nerve-related pain.  She is currently taking Advil. No other complaints at this time.

## 2021-01-04 NOTE — PHYSICAL EXAM
[de-identified] : General: Alert and oriented x3. In no acute distress. Pleasant in nature with a normal affect. No apparent respiratory distress.\par \par R Ankle Exam\par Skin: Clean, dry, intact\par Inspection: No obvious malalignment, Improved swelling, no effusion; no lymphadenopathy\par Pulses: 2+ DP/PT pulses\par ROM: R Ankle 10 degrees of dorsiflexion, 40 degrees of plantarflexion, 10 degrees of subtalar motion\par Tenderness: No pain over peroneals. No tenderness over the lateral malleolus, no CFL/ATFL/PTFL pain. No medial malleolus pain, no deltoid ligament pain. No proximal fibular pain. No heel pain.\par Stability: Negative anterior/posterior drawer.\par Strength: 5/5 TA/GS/EHL\par Neuro: In tact to light touch throughout\par Additional tests: Negative Mortons test, Negative syndesmosis squeeze test.  [de-identified] : 3V of the right ankle were ordered obtained and reviewed by me today, 01/04/2021 , revealed: Healed fibula fracture.

## 2021-01-09 ENCOUNTER — EMERGENCY (EMERGENCY)
Facility: HOSPITAL | Age: 31
LOS: 0 days | Discharge: HOME | End: 2021-01-09
Attending: EMERGENCY MEDICINE
Payer: MEDICAID

## 2021-01-09 VITALS
DIASTOLIC BLOOD PRESSURE: 79 MMHG | RESPIRATION RATE: 17 BRPM | HEART RATE: 95 BPM | OXYGEN SATURATION: 100 % | TEMPERATURE: 99 F | SYSTOLIC BLOOD PRESSURE: 100 MMHG

## 2021-01-09 VITALS — WEIGHT: 229.94 LBS | HEIGHT: 65 IN

## 2021-01-09 DIAGNOSIS — G47.33 OBSTRUCTIVE SLEEP APNEA (ADULT) (PEDIATRIC): ICD-10-CM

## 2021-01-09 DIAGNOSIS — Z98.890 OTHER SPECIFIED POSTPROCEDURAL STATES: Chronic | ICD-10-CM
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
## 2021-01-09 DIAGNOSIS — F32.9 MAJOR DEPRESSIVE DISORDER, SINGLE EPISODE, UNSPECIFIED: ICD-10-CM

## 2021-01-09 DIAGNOSIS — S51.811A LACERATION WITHOUT FOREIGN BODY OF RIGHT FOREARM, INITIAL ENCOUNTER: ICD-10-CM

## 2021-01-09 DIAGNOSIS — J45.909 UNSPECIFIED ASTHMA, UNCOMPLICATED: ICD-10-CM
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
## 2021-01-09 DIAGNOSIS — R01.1 CARDIAC MURMUR, UNSPECIFIED: ICD-10-CM

## 2021-01-09 DIAGNOSIS — Z79.891 LONG TERM (CURRENT) USE OF OPIATE ANALGESIC: ICD-10-CM

## 2021-01-09 PROCEDURE — 12001 RPR S/N/AX/GEN/TRNK 2.5CM/<: CPT

## 2021-01-09 PROCEDURE — 99282 EMERGENCY DEPT VISIT SF MDM: CPT | Mod: 25

## 2021-01-09 PROCEDURE — 99283 EMERGENCY DEPT VISIT LOW MDM: CPT

## 2021-01-09 NOTE — ED ADULT NURSE NOTE - CHIEF COMPLAINT QUOTE
pt presents to ED with complaints of right forearm laceration. pt reports laceration was caused by a door hinge. pt is UTD on tetanus.

## 2021-01-09 NOTE — ED STATDOCS - PATIENT PORTAL LINK FT
You can access the FollowMyHealth Patient Portal offered by [facility] by registering at the following website: [email or web address]. By joining [employer]’s FollowMyHealth portal, you will also be able to view your health information using other applications (apps) compatible with our system.

## 2021-01-09 NOTE — ED STATDOCS - NSFOLLOWUPINSTRUCTIONS_ED_ALL_ED_FT
Laceration    WHAT YOU NEED TO KNOW:    A laceration is an injury to the skin and the soft tissue underneath it. Lacerations happen when you are cut or hit by something. They can happen anywhere on the body.     DISCHARGE INSTRUCTIONS:    Return to the emergency department if:     You have heavy bleeding or bleeding that does not stop after 10 minutes of holding firm, direct pressure over the wound.       Your wound opens up.     Contact your healthcare provider if:     You have a fever or chills.       Your laceration is red, warm, or swollen.      You have red streaks on your skin coming from your wound.      You have white or yellow drainage from the wound that smells bad.      You have pain that gets worse, even after treatment.       You have questions or concerns about your condition or care.     Medicines:     Prescription pain medicine may be given. Ask how to take this medicine safely.       Antibiotics help treat or prevent a bacterial infection.       Take your medicine as directed. Contact your healthcare provider if you think your medicine is not helping or if you have side effects. Tell him or her if you are allergic to any medicine. Keep a list of the medicines, vitamins, and herbs you take. Include the amounts, and when and why you take them. Bring the list or the pill bottles to follow-up visits. Carry your medicine list with you in case of an emergency.    Care for your wound as directed:     Do not get your wound wet until your healthcare provider says it is okay. Do not soak your wound in water. Do not go swimming until your healthcare provider says it is okay. Carefully wash the wound with soap and water. Gently pat the area dry or allow it to air dry.       Change your bandages when they get wet, dirty, or after washing. Apply new, clean bandages as directed. Do not apply elastic bandages or tape too tight. Do not put powders or lotions over your incision.       Apply antibiotic ointment as directed. Your healthcare provider may give you antibiotic ointment to put over your wound if you have stitches. If you have strips of tape over your incision, let them dry up and fall off on their own. If they do not fall off within 14 days, gently remove them. If you have glue over your wound, do not remove or pick at it. If your glue comes off, do not replace it with glue that you have at home.       Check your wound every day for signs of infection such as swelling, redness, or pus.     Self-care:     Apply ice on your wound for 15 to 20 minutes every hour or as directed. Use an ice pack, or put crushed ice in a plastic bag. Cover it with a towel. Ice helps prevent tissue damage and decreases swelling and pain.      Use a splint as directed. A splint will decrease movement and stress on your wound. It may help it heal faster. A splint may be used for lacerations over joints or areas of your body that bend. Ask your healthcare provider how to apply and remove a splint.       Decrease scarring of your wound by applying ointments as directed. Do not apply ointments until your healthcare provider says it is okay. You may need to wait until your wound is healed. Ask which ointment to buy and how often to use it. After your wound is healed, use sunscreen over the area when you are out in the sun. You should do this for at least 6 months to 1 year after your injury.     Follow up with your healthcare provider as directed: You may need to follow up in 24 to 48 hours to have your wound checked for infection. You will need to return in 3 to 14 days if you have stitches or staples so they can be removed. Care for your wound as directed to prevent infection and help it heal. Write down your questions so you remember to ask them during your visits.    Rest. Elevate affected arm.  Change bandage daily. Clean with water then apply bacitracin ointment.   Tylenol 650 mg. PO every 4 hrs. for pain.  Follow up with PMD in 10-14 days for suture removal.

## 2021-01-09 NOTE — ED STATDOCS - ATTENDING CONTRIBUTION TO CARE
I, Barbie Becker MD,  performed the initial face to face bedside interview with this patient regarding history of present illness, review of symptoms and relevant past medical, social and family history.  I completed an independent physical examination.  I was the initial provider who evaluated this patient. I have signed out the follow up of any pending tests (i.e. labs, radiological studies) to the ACP.  I have communicated the patient’s plan of care and disposition with the ACP.  The history, relevant review of systems, past medical and surgical history, medical decision making, and physical examination was documented by the scribe in my presence and I attest to the accuracy of the documentation.

## 2021-01-09 NOTE — ED STATDOCS - CARE PROVIDER_API CALL
Saad Graff)  Internal Medicine  [address], [address] [address]  Phone: [phone]  Fax: [phone]  Follow Up Time:

## 2021-01-09 NOTE — ED ADULT TRIAGE NOTE - CHIEF COMPLAINT QUOTE
pt presents to ED with complaints of right forearm laceration. pt reports laceration was caused by a door hinge. pt presents to ED with complaints of right forearm laceration. pt reports laceration was caused by a door hinge. pt is UTD on tetanus.

## 2021-01-09 NOTE — ED STATDOCS - OBJECTIVE STATEMENT
30 u/o  female with PMHx of asthma, allergies, depression, TIMA presents to the ED c/o laceration to right forearm. States she caught her arm on a door hinge.  Tetanus not UTD. No fevers/chills.

## 2021-05-05 ENCOUNTER — NON-APPOINTMENT (OUTPATIENT)
Age: 31
End: 2021-05-05

## 2021-05-05 ENCOUNTER — APPOINTMENT (OUTPATIENT)
Dept: ORTHOPEDIC SURGERY | Facility: CLINIC | Age: 31
End: 2021-05-05
Payer: COMMERCIAL

## 2021-05-05 PROCEDURE — 99072 ADDL SUPL MATRL&STAF TM PHE: CPT

## 2021-05-05 PROCEDURE — 73610 X-RAY EXAM OF ANKLE: CPT | Mod: 26,RT

## 2021-05-05 PROCEDURE — 99213 OFFICE O/P EST LOW 20 MIN: CPT

## 2021-05-05 NOTE — PHYSICAL EXAM
[de-identified] : Right ankle Physical Examination:\par \par General: Alert and oriented x3.  In no acute distress.  Pleasant in nature with a normal affect.  No apparent respiratory distress. \par Erythema, Warmth, Rubor: Negative\par Swelling: Mild swelling over the lateral ligaments.\par \par ROM:\par 1. Dorsiflexion: 10 degrees\par 2. Plantarflexion: 40 degrees\par 3. Inversion: 10 degrees\par 4. Eversion: 10 degrees\par \par Tenderness to Palpation: \par 1. Lateral Malleolus: Negative\par 2. Medial Malleolus: Negative\par 3. Proximal Fibular Pain: Negative\par 4. Heel Pain: Negative\par 5. Cuboid: Negative\par 6. Navicular: Negative\par 7. Tibiotalar Joint: Negative\par 8. Subtalar Joint: Negative\par 9. Posterior Recess: Negative\par \par Tendon Pain:\par 1. Achilles: Negative\par 2. Peroneals: Positive\par 3. Posterior Tibialis: Negative\par 4. Tibialis Anterior: Negative\par \par Ligament Pain:\par 1. ATFL: Positive\par 2. CFL: Negative \par 3. PTFL: Negative\par 4. Deltoid Ligaments: Negative\par 5. Lis Franc Ligament: Negative\par \par Stability: \par 1. Anterior Drawer: Negative\par 2. Posterior Drawer: Negative\par \par Strength: 5/5 TA/GS/EHL\par \par Pulses: 2+ DP/PT Pulses\par \par Neuro: Intact motor and sensory\par \par Additional Test:\par 1. Calcaneal Squeeze Test: Negative\par 2. Syndesmosis Squeeze Test: Negative [de-identified] : X-rays of the right ankle reviewed, 5/5/2021: No fractures seen.

## 2021-05-05 NOTE — HISTORY OF PRESENT ILLNESS
[FreeTextEntry1] : Tracey is a 30-year-old female who presents with a new injury to her right ankle.  She rolled her ankle accidentally on Saturday.  The patient has some mild swelling over the lateral aspect of the ankle.  She is walking without assistance or brace today in the office.  Her pain scale is 3 out of 10.  She denies locking and catching of the ankle.  She has no numbness or tingling in the ankle and foot.

## 2021-05-05 NOTE — DISCUSSION/SUMMARY
[de-identified] : Assessment: Right ankle Sprain/Injury.\par \par Plan:\par #1.  ASO brace given for support.  Wear as instructed for two weeks. Crutches as needed.\par #2. Antiinflammatories/Tylenol as needed.\par #3. RICE therapy\par #4. Limit activities and use of ankle for 2 weeks. \par #5. All questions answered.  Patient will follow-up in office in 6 weeks PRN.  If symptoms worsen or patient has concerns, they may call office and/or come into office sooner if needed.

## 2021-05-05 NOTE — PHYSICAL EXAM
[de-identified] : Right ankle Physical Examination:\par \par General: Alert and oriented x3.  In no acute distress.  Pleasant in nature with a normal affect.  No apparent respiratory distress. \par Erythema, Warmth, Rubor: Negative\par Swelling: Mild swelling over the lateral ligaments.\par \par ROM:\par 1. Dorsiflexion: 10 degrees\par 2. Plantarflexion: 40 degrees\par 3. Inversion: 10 degrees\par 4. Eversion: 10 degrees\par \par Tenderness to Palpation: \par 1. Lateral Malleolus: Negative\par 2. Medial Malleolus: Negative\par 3. Proximal Fibular Pain: Negative\par 4. Heel Pain: Negative\par 5. Cuboid: Negative\par 6. Navicular: Negative\par 7. Tibiotalar Joint: Negative\par 8. Subtalar Joint: Negative\par 9. Posterior Recess: Negative\par \par Tendon Pain:\par 1. Achilles: Negative\par 2. Peroneals: Positive\par 3. Posterior Tibialis: Negative\par 4. Tibialis Anterior: Negative\par \par Ligament Pain:\par 1. ATFL: Positive\par 2. CFL: Negative \par 3. PTFL: Negative\par 4. Deltoid Ligaments: Negative\par 5. Lis Franc Ligament: Negative\par \par Stability: \par 1. Anterior Drawer: Negative\par 2. Posterior Drawer: Negative\par \par Strength: 5/5 TA/GS/EHL\par \par Pulses: 2+ DP/PT Pulses\par \par Neuro: Intact motor and sensory\par \par Additional Test:\par 1. Calcaneal Squeeze Test: Negative\par 2. Syndesmosis Squeeze Test: Negative [de-identified] : X-rays of the right ankle reviewed, 5/5/2021: No fractures seen.

## 2021-05-05 NOTE — DISCUSSION/SUMMARY
[de-identified] : Assessment: Right ankle Sprain/Injury.\par \par Plan:\par #1.  ASO brace given for support.  Wear as instructed for two weeks. Crutches as needed.\par #2. Antiinflammatories/Tylenol as needed.\par #3. RICE therapy\par #4. Limit activities and use of ankle for 2 weeks. \par #5. All questions answered.  Patient will follow-up in office in 6 weeks PRN.  If symptoms worsen or patient has concerns, they may call office and/or come into office sooner if needed.

## 2021-05-26 ENCOUNTER — APPOINTMENT (OUTPATIENT)
Dept: ORTHOPEDIC SURGERY | Facility: CLINIC | Age: 31
End: 2021-05-26
Payer: COMMERCIAL

## 2021-05-26 DIAGNOSIS — T84.84XA PAIN DUE TO INTERNAL ORTHOPEDIC PROSTHETIC DEVICES, IMPLANTS AND GRAFTS, INITIAL ENCOUNTER: ICD-10-CM

## 2021-05-26 DIAGNOSIS — M19.079 PRIMARY OSTEOARTHRITIS, UNSPECIFIED ANKLE AND FOOT: ICD-10-CM

## 2021-05-26 PROCEDURE — 99214 OFFICE O/P EST MOD 30 MIN: CPT

## 2021-05-26 NOTE — ADDENDUM
[FreeTextEntry1] : I, Nhan Chamorro, acted solely as a scribe for Dr. Raffy Mercedes on this date 05/26/2021  .\par  \par All medical record entries made by the Scribe were at my, Dr. Raffy Mercedes, direction and personally dictated by me on 05/26/2021 . I have reviewed the chart and agree that the record accurately reflects my personal performance of the history, physical exam, assessment and plan. I have also personally directed, reviewed, and agreed with the chart.

## 2021-05-26 NOTE — DISCUSSION/SUMMARY
[de-identified] : Assessment: Right ankle Sprain/Injury.\par \par Due to her increased pain and no improvement with non-operative treatment, immobilization, I would like to obtain advanced imaging of the patient's right ankle. An MRI was ordered so I can find out more about the etiology of the patient's condition. The patient should follow up with the office after obtaining the MRI. \par \par The patient understood and verbally agreed to the treatment plan. All of their questions were answered and they were satisfied with the visit. The patient should call the office if they have any questions or experience worsening symptoms.

## 2021-05-26 NOTE — HISTORY OF PRESENT ILLNESS
[FreeTextEntry1] : 5/26/2021: TRACEY MEJIA is a 31 year old female who presents for follow-up evaluation of her right ankle. She states there is pain on top of the ankle and laterally. Her pain scale is 4/10. She states her pain has worsened since the last visit. \par \par 5/5/2021: Tracey is a 30-year-old female who presents with a new injury to her right ankle.  She rolled her ankle accidentally on Saturday.  The patient has some mild swelling over the lateral aspect of the ankle.  She is walking without assistance or brace today in the office.  Her pain scale is 3 out of 10.  She denies locking and catching of the ankle.  She has no numbness or tingling in the ankle and foot.

## 2021-05-26 NOTE — PHYSICAL EXAM
[de-identified] : Right ankle Physical Examination:\par \par General: Alert and oriented x3.  In no acute distress.  Pleasant in nature with a normal affect.  No apparent respiratory distress. \par Erythema, Warmth, Rubor: Negative\par Swelling: Mild swelling over the lateral ligaments.\par \par ROM:\par 1. Dorsiflexion: 10 degrees\par 2. Plantarflexion: 40 degrees\par 3. Inversion: 10 degrees\par 4. Eversion: 10 degrees\par \par Tenderness to Palpation: \par 1. Lateral Malleolus: Negative\par 2. Medial Malleolus: Negative\par 3. Proximal Fibular Pain: Negative\par 4. Heel Pain: Negative\par 5. Cuboid: Negative\par 6. Navicular: Negative\par 7. Tibiotalar Joint: Negative\par 8. Subtalar Joint: Negative\par 9. Posterior Recess: Negative\par 10. Plantar: Positive\par \par Tendon Pain:\par 1. Achilles: Negative\par 2. Peroneals: Positive\par 3. Posterior Tibialis: Negative\par 4. Tibialis Anterior: Negative\par \par Ligament Pain:\par 1. ATFL: Positive\par 2. CFL: Negative \par 3. PTFL: Negative\par 4. Deltoid Ligaments: Negative\par 5. Lis Franc Ligament: Negative\par \par Stability: \par 1. Anterior Drawer: Negative\par 2. Posterior Drawer: Negative\par \par Strength: 5/5 TA/GS/EHL\par \par Pulses: 2+ DP/PT Pulses\par \par Neuro: Intact motor and sensory\par \par Additional Test:\par 1. Calcaneal Squeeze Test: Negative\par 2. Syndesmosis Squeeze Test: Negative [de-identified] : No new imaging today.

## 2021-06-12 ENCOUNTER — APPOINTMENT (OUTPATIENT)
Dept: MRI IMAGING | Facility: CLINIC | Age: 31
End: 2021-06-12
Payer: COMMERCIAL

## 2021-06-12 ENCOUNTER — OUTPATIENT (OUTPATIENT)
Dept: OUTPATIENT SERVICES | Facility: HOSPITAL | Age: 31
LOS: 1 days | End: 2021-06-12

## 2021-06-12 DIAGNOSIS — M19.079 PRIMARY OSTEOARTHRITIS, UNSPECIFIED ANKLE AND FOOT: ICD-10-CM

## 2021-06-12 DIAGNOSIS — Z98.890 OTHER SPECIFIED POSTPROCEDURAL STATES: Chronic | ICD-10-CM

## 2021-06-12 PROCEDURE — 73721 MRI JNT OF LWR EXTRE W/O DYE: CPT | Mod: 26,RT

## 2021-06-16 ENCOUNTER — APPOINTMENT (OUTPATIENT)
Dept: ORTHOPEDIC SURGERY | Facility: CLINIC | Age: 31
End: 2021-06-16

## 2021-06-16 ENCOUNTER — APPOINTMENT (OUTPATIENT)
Dept: ORTHOPEDIC SURGERY | Facility: CLINIC | Age: 31
End: 2021-06-16
Payer: COMMERCIAL

## 2021-06-16 DIAGNOSIS — S82.841D DISPLACED BIMALLEOLAR FRACTURE OF RIGHT LOWER LEG, SUBSEQUENT ENCOUNTER FOR CLOSED FRACTURE WITH ROUTINE HEALING: ICD-10-CM

## 2021-06-16 DIAGNOSIS — S93.401A SPRAIN OF UNSPECIFIED LIGAMENT OF RIGHT ANKLE, INITIAL ENCOUNTER: ICD-10-CM

## 2021-06-16 PROCEDURE — 99214 OFFICE O/P EST MOD 30 MIN: CPT

## 2021-06-16 NOTE — HISTORY OF PRESENT ILLNESS
[FreeTextEntry1] : 6/16/21: The patient returns to the office to review the MRI of her right ankle.  Her pain scale is 2 out of 10.  She has most of her pain on the outside portion of her ankle after she rolled her ankle.  She denies locking or catching of her ankle.  No other complaints.\par \par 5/26/2021: TRACEY MEJIA is a 31 year old female who presents for follow-up evaluation of her right ankle. She states there is pain on top of the ankle and laterally. Her pain scale is 4/10. She states her pain has worsened since the last visit. \par \par 5/5/2021: Tracey is a 30-year-old female who presents with a new injury to her right ankle.  She rolled her ankle accidentally on Saturday.  The patient has some mild swelling over the lateral aspect of the ankle.  She is walking without assistance or brace today in the office.  Her pain scale is 3 out of 10.  She denies locking and catching of the ankle.  She has no numbness or tingling in the ankle and foot.

## 2021-06-16 NOTE — PHYSICAL EXAM
[de-identified] : Right ankle Physical Examination:\par \par General: Alert and oriented x3.  In no acute distress.  Pleasant in nature with a normal affect.  No apparent respiratory distress. \par Erythema, Warmth, Rubor: Negative\par Swelling: Mild swelling over the lateral ligaments.\par \par ROM:\par 1. Dorsiflexion: 10 degrees\par 2. Plantarflexion: 40 degrees\par 3. Inversion: 10 degrees\par 4. Eversion: 10 degrees\par \par Tenderness to Palpation: \par 1. Lateral Malleolus: Negative\par 2. Medial Malleolus: Negative\par 3. Proximal Fibular Pain: Negative\par 4. Heel Pain: Negative\par 5. Cuboid: Negative\par 6. Navicular: Negative\par 7. Tibiotalar Joint: Negative\par 8. Subtalar Joint: Negative\par 9. Posterior Recess: Negative\par 10. Plantar: Positive\par \par Tendon Pain:\par 1. Achilles: Negative\par 2. Peroneals: Positive\par 3. Posterior Tibialis: Negative\par 4. Tibialis Anterior: Negative\par \par Ligament Pain:\par 1. ATFL: Positive\par 2. CFL: Negative \par 3. PTFL: Negative\par 4. Deltoid Ligaments: Negative\par 5. Lis Franc Ligament: Negative\par \par Stability: \par 1. Anterior Drawer: Negative\par 2. Posterior Drawer: Negative\par \par Strength: 5/5 TA/GS/EHL\par \par Pulses: 2+ DP/PT Pulses\par \par Neuro: Intact motor and sensory\par \par Additional Test:\par 1. Calcaneal Squeeze Test: Negative\par 2. Syndesmosis Squeeze Test: Negative [de-identified] : \par \par EXAM: MR ANKLE RT\par \par \par PROCEDURE DATE: 06/12/2021\par \par \par \par INTERPRETATION: RIGHT ANKLE MRI\par \par CLINICAL INFORMATION: Ankle pain. Inversion injury. History of ankle fracture status post surgery.\par TECHNIQUE: Multiplanar, multisequence MRI was obtained of the right ankle.\par \par COMPARISON: Right ankle MRI 8/28/2020.\par \par FINDINGS:\par \par MUSCLES AND TENDONS: Medial and lateral flexor tendons are intact. Trace fluid is present within the peroneus tendon sheaths. Anterior extensors are unremarkable. Achilles tendon is intact. No muscle edema or fatty atrophy.\par LIGAMENTS: Scar remodeling of the syndesmotic ligaments. Sequela of high-grade sprain of the anterior talofibular ligament with no well-defined ligamentous fibers. The calcaneofibular and posterior talofibular ligaments are intact. Scar remodeling of the deltoid ligament complex.\par CARTILAGE and SUBCHONDRAL BONE: There is anterior tibiotalar joint space narrowing. There is anterior tibial osteophyte formation. Subchondral edema is present within the medial and lateral talar domes and within the anteromedial tibial plafond, consistent with overlying high-grade chondral loss. Remaining joint spaces are maintained.\par SYNOVIUM/JOINT FLUID: There is a small tibiotalar joint effusion and synovitis.\par MARROW: Sequela of distal fibular fracture is partially visualized.. Foci of subchondral edema within the tibiotalar articulation. No acute fracture or osteonecrosis. Susceptibility artifact is present within the distal tibia from hardware.\par PLANTAR FASCIA: Plantar fascia is intact.\par NEUROVASCULAR STRUCTURES: Course of the neurovascular structures are unremarkable.\par SINUS TARSI: Fat within the sinus Tarsi is maintained.\par PERIPHERAL/ SUB-CUTANEOUS SOFT TISSUES: Intact.\par \par IMPRESSION:\par Tibiotalar joint space narrowing with foci of high-grade chondral loss with subchondral cystic change and edema as well as osteophyte formation at the tibiotalar articulation, consistent with arthrosis. There is a small tibiotalar joint effusion and synovitis.\par Mild peroneus tenosynovitis.\par Chronic tear of the anterior talofibular ligament with no well defined fibers. Scar remodeling of the syndesmotic and deltoid ligament complex.\par \par \par \par \par \par \par EVE SWAN MD; Attending Radiologist\par This document has been electronically signed. Jun 16 2021 9:17AM\par \par \par \par \par \par    \par \par \par \par \par \par \par \par \par \par \par \par \par \par \par \par   \par  \par  \par \par \par Notes \par   \par    \par \par \par \par \par \par \par \par \par \par \par  \par \par \par  \par \par \par \par \par \par \par \par \par \par \par \par \par \par \par \par \par \par \par \par

## 2021-07-09 NOTE — ASU PREOP CHECKLIST - LATEX ALLERGY
Atrovent was started by pulmonology for what they felt to be post-viral cough. I have provided Incruse ellipta as an alternative given she has another persistent cough in the post-viral setting. They also felt that a component of the cough was secondary to GERD and did respond (per notes from follow up visit in 5/2019) to lifestyle changes.     Sheree Graf M.D.         no

## 2021-07-28 ENCOUNTER — APPOINTMENT (OUTPATIENT)
Dept: INTERNAL MEDICINE | Facility: CLINIC | Age: 31
End: 2021-07-28
Payer: COMMERCIAL

## 2021-07-28 VITALS
RESPIRATION RATE: 16 BRPM | HEIGHT: 65 IN | BODY MASS INDEX: 43.49 KG/M2 | OXYGEN SATURATION: 98 % | HEART RATE: 77 BPM | TEMPERATURE: 98.4 F | SYSTOLIC BLOOD PRESSURE: 110 MMHG | WEIGHT: 261 LBS | DIASTOLIC BLOOD PRESSURE: 70 MMHG

## 2021-07-28 DIAGNOSIS — F32.9 MAJOR DEPRESSIVE DISORDER, SINGLE EPISODE, UNSPECIFIED: ICD-10-CM

## 2021-07-28 DIAGNOSIS — Z01.811 ENCOUNTER FOR PREPROCEDURAL RESPIRATORY EXAMINATION: ICD-10-CM

## 2021-07-28 PROCEDURE — 99204 OFFICE O/P NEW MOD 45 MIN: CPT | Mod: 25

## 2021-07-28 PROCEDURE — 94729 DIFFUSING CAPACITY: CPT

## 2021-07-28 PROCEDURE — 94727 GAS DIL/WSHOT DETER LNG VOL: CPT

## 2021-07-28 PROCEDURE — ZZZZZ: CPT

## 2021-07-28 PROCEDURE — G0447 BEHAVIOR COUNSEL OBESITY 15M: CPT

## 2021-07-28 PROCEDURE — 94010 BREATHING CAPACITY TEST: CPT

## 2021-07-28 RX ORDER — METRONIDAZOLE 500 MG/1
500 TABLET ORAL
Qty: 50 | Refills: 0 | Status: DISCONTINUED | COMMUNITY
Start: 2020-06-24 | End: 2021-07-28

## 2021-07-28 RX ORDER — PREDNISONE 10 MG/1
10 TABLET ORAL
Qty: 30 | Refills: 0 | Status: DISCONTINUED | COMMUNITY
Start: 2020-06-08 | End: 2021-07-28

## 2021-07-28 RX ORDER — FLUCONAZOLE 150 MG/1
150 TABLET ORAL
Qty: 1 | Refills: 0 | Status: DISCONTINUED | COMMUNITY
Start: 2020-06-23 | End: 2021-07-28

## 2021-07-28 RX ORDER — FAMOTIDINE 20 MG/1
20 TABLET, FILM COATED ORAL
Qty: 7 | Refills: 0 | Status: DISCONTINUED | COMMUNITY
Start: 2020-08-15 | End: 2021-07-28

## 2021-07-28 RX ORDER — BUPROPION HYDROCHLORIDE 300 MG/1
300 TABLET, EXTENDED RELEASE ORAL
Qty: 30 | Refills: 0 | Status: DISCONTINUED | COMMUNITY
Start: 2019-02-25 | End: 2021-07-28

## 2021-07-28 RX ORDER — CETIRIZINE HYDROCHLORIDE 10 MG/1
10 TABLET, COATED ORAL
Qty: 30 | Refills: 0 | Status: DISCONTINUED | COMMUNITY
Start: 2020-06-01 | End: 2021-07-28

## 2021-07-28 RX ORDER — PANTOPRAZOLE 40 MG/1
40 TABLET, DELAYED RELEASE ORAL
Qty: 30 | Refills: 0 | Status: DISCONTINUED | COMMUNITY
Start: 2020-07-31 | End: 2021-07-28

## 2021-07-28 RX ORDER — VORTIOXETINE 20 MG/1
20 TABLET, FILM COATED ORAL
Qty: 30 | Refills: 0 | Status: DISCONTINUED | COMMUNITY
Start: 2019-02-19 | End: 2021-07-28

## 2021-07-28 RX ORDER — DOXYCYCLINE HYCLATE 100 MG/1
100 TABLET ORAL
Qty: 28 | Refills: 0 | Status: DISCONTINUED | COMMUNITY
Start: 2020-06-19 | End: 2021-07-28

## 2021-07-28 RX ORDER — AZITHROMYCIN 500 MG/1
500 TABLET, FILM COATED ORAL
Qty: 4 | Refills: 0 | Status: DISCONTINUED | COMMUNITY
Start: 2020-06-23 | End: 2021-07-28

## 2021-07-28 RX ORDER — ALBUTEROL SULFATE 90 UG/1
108 (90 BASE) INHALANT RESPIRATORY (INHALATION)
Qty: 1 | Refills: 1 | Status: ACTIVE | COMMUNITY
Start: 2021-07-28 | End: 1900-01-01

## 2021-07-28 NOTE — PROCEDURE
[FreeTextEntry1] : Full pulmonary function testing today shows a normal FEV1 of 2.95 or 89% predicted.  FEV1/FVC is reduced suggestive of airway obstruction.  Diffusing capacity is normal, oxygen saturation 98% on room air.

## 2021-07-28 NOTE — HISTORY OF PRESENT ILLNESS
[TextBox_4] : This is a 31-year-old female with a history including anxiety, depression, obstructive sleep apnea, and asthma who is seen for preoperative pulmonary evaluation before planned bariatric surgery on August 13.  Surgeon is Dr. Aguiar.  The patient had a sleep study in 2014 which showed obstructive sleep apnea.  She refused to try CPAP at that time.  She was seen by Dr. Romero, neurology, in 2019 and was recommended for a home sleep study but did not follow through at that time.  She continues to have snoring, daytime somnolence and does take naps.\par \par Regarding her asthma, patient states she has not had any recent coughing, wheezing, or dyspnea on exertion.  She denies sputum production, fever, or chills.  Patient has had to go on prednisone one time about 2 years ago.  She has albuterol to use as rescue but tells me she has not needed this recently.\par \par She is a excigarette smoker, 1/3 pack per day for 11 years, and quit 2 years ago.  She currently is vaping and was counseled and instructed to discontinue this altogether.\par \par Past surgeries include dislocated right ankle with plate and screw placement May 2019, removal of most of hardware of previous ankle surgery December 2019.  There is no history of problems or complications with these anesthesias.

## 2021-07-28 NOTE — PHYSICAL EXAM
[No Acute Distress] : no acute distress [Normal Oropharynx] : normal oropharynx [Normal Appearance] : normal appearance [No Neck Mass] : no neck mass [Normal Rate/Rhythm] : normal rate/rhythm [Normal S1, S2] : normal s1, s2 [No Murmurs] : no murmurs [No Resp Distress] : no resp distress [Clear to Auscultation Bilaterally] : clear to auscultation bilaterally [No Abnormalities] : no abnormalities [Benign] : benign [Normal Gait] : normal gait [No Clubbing] : no clubbing [No Cyanosis] : no cyanosis [No Edema] : no edema [Normal Color/ Pigmentation] : normal color/ pigmentation [No Focal Deficits] : no focal deficits [Oriented x3] : oriented x3 [Normal Affect] : normal affect [TextBox_2] : morbid obesity [TextBox_68] : rare expir wheeze with forced expir manuever

## 2021-07-28 NOTE — COUNSELING
[Potential consequences of obesity discussed] : Potential consequences of obesity discussed [Benefits of weight loss discussed] : Benefits of weight loss discussed [Decrease Portions] : decrease portions [____ min/wk Activity] : [unfilled] min/wk activity [FreeTextEntry4] : 15

## 2021-07-28 NOTE — ASSESSMENT
[FreeTextEntry1] : #1 Asthma.  Patient will begin Symbicort 160 strength 1 puff twice daily.  continue rescue albuterol 2 puffs 4 times daily as needed.  \par \par #2 TIMA.  Patient refused trying CPAP in the past. BA will continue strict weight reduction efforts, sleep on the sides, and avoid alcohol and sedating medicines. The patient knows fully not to drive or work with machinery if having any sleepiness or tiredness. \par \par #3 vaping. counseled on discontinuing vaping altogether. pt knows she must stop this and importance of doing so. \par \par #4 morbid obesity.  \par \par There is no pulmonary contraindication in the patient proceeding to have her planned bariatric surgery on August 13.  She will continue Symbicort 160 strength 1 puff twice daily.  Albuterol inhaler as needed for rescue.  I recommend that she take albuterol 2 puffs on-call to the OR.  Patient should have a preoperative lung exam.  Patient should be monitored closely intraoperatively and postoperatively.  I also recommend having oxygen and either CPAP or BiPAP available postoperatively, given the patient's history of obstructive sleep apnea.\par \par RV here in 6 months.

## 2021-07-30 ENCOUNTER — APPOINTMENT (OUTPATIENT)
Dept: CARDIOLOGY | Facility: CLINIC | Age: 31
End: 2021-07-30
Payer: COMMERCIAL

## 2021-07-30 ENCOUNTER — NON-APPOINTMENT (OUTPATIENT)
Age: 31
End: 2021-07-30

## 2021-07-30 VITALS
OXYGEN SATURATION: 97 % | WEIGHT: 259 LBS | DIASTOLIC BLOOD PRESSURE: 68 MMHG | HEART RATE: 68 BPM | HEIGHT: 65 IN | BODY MASS INDEX: 43.15 KG/M2 | SYSTOLIC BLOOD PRESSURE: 107 MMHG

## 2021-07-30 DIAGNOSIS — R01.1 CARDIAC MURMUR, UNSPECIFIED: ICD-10-CM

## 2021-07-30 DIAGNOSIS — J45.909 UNSPECIFIED ASTHMA, UNCOMPLICATED: ICD-10-CM

## 2021-07-30 DIAGNOSIS — G47.33 OBSTRUCTIVE SLEEP APNEA (ADULT) (PEDIATRIC): ICD-10-CM

## 2021-07-30 DIAGNOSIS — Z01.810 ENCOUNTER FOR PREPROCEDURAL CARDIOVASCULAR EXAMINATION: ICD-10-CM

## 2021-07-30 DIAGNOSIS — E66.01 MORBID (SEVERE) OBESITY DUE TO EXCESS CALORIES: ICD-10-CM

## 2021-07-30 PROCEDURE — 93000 ELECTROCARDIOGRAM COMPLETE: CPT | Mod: NC

## 2021-07-30 PROCEDURE — 99204 OFFICE O/P NEW MOD 45 MIN: CPT

## 2021-07-30 RX ORDER — BUDESONIDE AND FORMOTEROL FUMARATE DIHYDRATE 160; 4.5 UG/1; UG/1
160-4.5 AEROSOL RESPIRATORY (INHALATION)
Qty: 1 | Refills: 1 | Status: ACTIVE | COMMUNITY
Start: 2021-07-28 | End: 1900-01-01

## 2021-08-05 ENCOUNTER — OUTPATIENT (OUTPATIENT)
Dept: OUTPATIENT SERVICES | Facility: HOSPITAL | Age: 31
LOS: 1 days | End: 2021-08-05
Payer: COMMERCIAL

## 2021-08-05 VITALS
DIASTOLIC BLOOD PRESSURE: 48 MMHG | WEIGHT: 256.4 LBS | HEART RATE: 80 BPM | TEMPERATURE: 98 F | RESPIRATION RATE: 16 BRPM | SYSTOLIC BLOOD PRESSURE: 105 MMHG | HEIGHT: 65 IN | OXYGEN SATURATION: 98 %

## 2021-08-05 DIAGNOSIS — Z98.890 OTHER SPECIFIED POSTPROCEDURAL STATES: Chronic | ICD-10-CM

## 2021-08-05 DIAGNOSIS — E66.01 MORBID (SEVERE) OBESITY DUE TO EXCESS CALORIES: ICD-10-CM

## 2021-08-05 DIAGNOSIS — Z01.818 ENCOUNTER FOR OTHER PREPROCEDURAL EXAMINATION: ICD-10-CM

## 2021-08-05 LAB
ALBUMIN SERPL ELPH-MCNC: 3.8 G/DL — SIGNIFICANT CHANGE UP (ref 3.3–5)
ANION GAP SERPL CALC-SCNC: 4 MMOL/L — LOW (ref 5–17)
APPEARANCE UR: CLEAR — SIGNIFICANT CHANGE UP
APTT BLD: 31.6 SEC — SIGNIFICANT CHANGE UP (ref 27.5–35.5)
BASOPHILS # BLD AUTO: 0.05 K/UL — SIGNIFICANT CHANGE UP (ref 0–0.2)
BASOPHILS NFR BLD AUTO: 0.7 % — SIGNIFICANT CHANGE UP (ref 0–2)
BILIRUB UR-MCNC: NEGATIVE — SIGNIFICANT CHANGE UP
BUN SERPL-MCNC: 13 MG/DL — SIGNIFICANT CHANGE UP (ref 7–23)
CALCIUM SERPL-MCNC: 9.4 MG/DL — SIGNIFICANT CHANGE UP (ref 8.5–10.1)
CHLORIDE SERPL-SCNC: 108 MMOL/L — SIGNIFICANT CHANGE UP (ref 96–108)
CO2 SERPL-SCNC: 29 MMOL/L — SIGNIFICANT CHANGE UP (ref 22–31)
COHGB MFR BLDV: 1.8 % — HIGH (ref 0–1.5)
COLOR SPEC: YELLOW — SIGNIFICANT CHANGE UP
CREAT SERPL-MCNC: 0.97 MG/DL — SIGNIFICANT CHANGE UP (ref 0.5–1.3)
DIFF PNL FLD: ABNORMAL
EOSINOPHIL # BLD AUTO: 0.28 K/UL — SIGNIFICANT CHANGE UP (ref 0–0.5)
EOSINOPHIL NFR BLD AUTO: 3.8 % — SIGNIFICANT CHANGE UP (ref 0–6)
GLUCOSE SERPL-MCNC: 99 MG/DL — SIGNIFICANT CHANGE UP (ref 70–99)
GLUCOSE UR QL: NEGATIVE MG/DL — SIGNIFICANT CHANGE UP
HCT VFR BLD CALC: 42.4 % — SIGNIFICANT CHANGE UP (ref 34.5–45)
HGB BLD-MCNC: 14.3 G/DL — SIGNIFICANT CHANGE UP (ref 11.5–15.5)
IMM GRANULOCYTES NFR BLD AUTO: 0.3 % — SIGNIFICANT CHANGE UP (ref 0–1.5)
INR BLD: 0.97 RATIO — SIGNIFICANT CHANGE UP (ref 0.88–1.16)
KETONES UR-MCNC: NEGATIVE — SIGNIFICANT CHANGE UP
LEUKOCYTE ESTERASE UR-ACNC: ABNORMAL
LYMPHOCYTES # BLD AUTO: 1.9 K/UL — SIGNIFICANT CHANGE UP (ref 1–3.3)
LYMPHOCYTES # BLD AUTO: 25.9 % — SIGNIFICANT CHANGE UP (ref 13–44)
MCHC RBC-ENTMCNC: 30.1 PG — SIGNIFICANT CHANGE UP (ref 27–34)
MCHC RBC-ENTMCNC: 33.7 GM/DL — SIGNIFICANT CHANGE UP (ref 32–36)
MCV RBC AUTO: 89.3 FL — SIGNIFICANT CHANGE UP (ref 80–100)
MONOCYTES # BLD AUTO: 0.5 K/UL — SIGNIFICANT CHANGE UP (ref 0–0.9)
MONOCYTES NFR BLD AUTO: 6.8 % — SIGNIFICANT CHANGE UP (ref 2–14)
NEUTROPHILS # BLD AUTO: 4.6 K/UL — SIGNIFICANT CHANGE UP (ref 1.8–7.4)
NEUTROPHILS NFR BLD AUTO: 62.5 % — SIGNIFICANT CHANGE UP (ref 43–77)
NITRITE UR-MCNC: NEGATIVE — SIGNIFICANT CHANGE UP
PH UR: 6 — SIGNIFICANT CHANGE UP (ref 5–8)
PLATELET # BLD AUTO: 268 K/UL — SIGNIFICANT CHANGE UP (ref 150–400)
POTASSIUM SERPL-MCNC: 4.4 MMOL/L — SIGNIFICANT CHANGE UP (ref 3.5–5.3)
POTASSIUM SERPL-SCNC: 4.4 MMOL/L — SIGNIFICANT CHANGE UP (ref 3.5–5.3)
PROT UR-MCNC: NEGATIVE MG/DL — SIGNIFICANT CHANGE UP
PROTHROM AB SERPL-ACNC: 11.3 SEC — SIGNIFICANT CHANGE UP (ref 10.6–13.6)
RBC # BLD: 4.75 M/UL — SIGNIFICANT CHANGE UP (ref 3.8–5.2)
RBC # FLD: 12.5 % — SIGNIFICANT CHANGE UP (ref 10.3–14.5)
SODIUM SERPL-SCNC: 141 MMOL/L — SIGNIFICANT CHANGE UP (ref 135–145)
SP GR SPEC: 1.02 — SIGNIFICANT CHANGE UP (ref 1.01–1.02)
UROBILINOGEN FLD QL: NEGATIVE MG/DL — SIGNIFICANT CHANGE UP
WBC # BLD: 7.35 K/UL — SIGNIFICANT CHANGE UP (ref 3.8–10.5)
WBC # FLD AUTO: 7.35 K/UL — SIGNIFICANT CHANGE UP (ref 3.8–10.5)

## 2021-08-05 PROCEDURE — 93005 ELECTROCARDIOGRAM TRACING: CPT

## 2021-08-05 PROCEDURE — 85610 PROTHROMBIN TIME: CPT

## 2021-08-05 PROCEDURE — 80048 BASIC METABOLIC PNL TOTAL CA: CPT

## 2021-08-05 PROCEDURE — 82375 ASSAY CARBOXYHB QUANT: CPT

## 2021-08-05 PROCEDURE — 82040 ASSAY OF SERUM ALBUMIN: CPT

## 2021-08-05 PROCEDURE — 86850 RBC ANTIBODY SCREEN: CPT

## 2021-08-05 PROCEDURE — 93010 ELECTROCARDIOGRAM REPORT: CPT

## 2021-08-05 PROCEDURE — 85730 THROMBOPLASTIN TIME PARTIAL: CPT

## 2021-08-05 PROCEDURE — 71046 X-RAY EXAM CHEST 2 VIEWS: CPT

## 2021-08-05 PROCEDURE — 86900 BLOOD TYPING SEROLOGIC ABO: CPT

## 2021-08-05 PROCEDURE — 81001 URINALYSIS AUTO W/SCOPE: CPT

## 2021-08-05 PROCEDURE — 36415 COLL VENOUS BLD VENIPUNCTURE: CPT

## 2021-08-05 PROCEDURE — G0463: CPT | Mod: 25

## 2021-08-05 PROCEDURE — 71046 X-RAY EXAM CHEST 2 VIEWS: CPT | Mod: 26

## 2021-08-05 PROCEDURE — 86901 BLOOD TYPING SEROLOGIC RH(D): CPT

## 2021-08-05 PROCEDURE — 85025 COMPLETE CBC W/AUTO DIFF WBC: CPT

## 2021-08-05 NOTE — H&P PST ADULT - NSICDXPASTMEDICALHX_GEN_ALL_CORE_FT
PAST MEDICAL HISTORY:  Apnea, sleep does not use cpap    Asthma exercise induced    COVID-19 vaccine series completed [place] vaccine given 3/20/2021 and 4/17/2021    Depression     Fracture Right ankle closed bimalleolar fracture and distal end of fibula 5/2019    H/O seasonal allergies     H/O seasonal allergies     Heart murmur     TIMA (obstructive sleep apnea)

## 2021-08-05 NOTE — H&P PST ADULT - NSICDXFAMILYHX_GEN_ALL_CORE_FT
FAMILY HISTORY:  Family history of DVT, PE father alive as of 2021  FH: breast cancer, maternal grandmother is   FH: diabetes mellitus, paternal grandmother who is alive as of 2021  FH: heart disease, maternal grandparents both are

## 2021-08-05 NOTE — H&P PST ADULT - HISTORY OF PRESENT ILLNESS
This is a 32 y/o female with a PMH of depression, asthma (no hospitalizations) and sleep apnea (does not use cpap). Pt has a BMI of 42.7 and has tried multiple diet and exercise programs without success. She is now scheduled for a laparoscopic gastric sleeve. She denies SOB, chest pain, palpations or recent fevers.

## 2021-08-05 NOTE — H&P PST ADULT - NSICDXPASTSURGICALHX_GEN_ALL_CORE_FT
PAST SURGICAL HISTORY:  H/O surgical biopsy right ankle 8/2019    S/P ORIF (open reduction internal fixation) fracture right leg 5/2019 and 12/2019

## 2021-08-05 NOTE — H&P PST ADULT - ASSESSMENT
This is a 32 y/o female with obesity who is scheduled for a laparoscopic gastric sleeve     Patient instructed on     1. To follow instructions as per ASU for NPO status   2. On the use of EZ sponges  3. Aware that she needs medical clearance (will be done Dr. Hannah Jimenez)  4. May take Wellbutrin and Trintellix with a sip of water on morning of procedure   5. Instructed to call [phone] to confirm COVID 19 appointment [facility] is scheduled for August 10, 2021    CAPRINI SCORE    AGE RELATED RISK FACTORS                                                       MOBILITY RELATED FACTORS  [ ] Age 41-60 years                                            (1 Point)                  [ ] Bed rest                                                        (1 Point)  [ ] Age: 61-74 years                                           (2 Points)                [ ] Plaster cast                                                   (2 Points)  [ ] Age= 75 years                                              (3 Points)                 [ ] Bed bound for more than 72 hours                   (2 Points)    DISEASE RELATED RISK FACTORS                                               GENDER SPECIFIC FACTORS  [ ] Edema in the lower extremities                       (1 Point)                  [ ] Pregnancy                                                     (1 Point)  [ ] Varicose veins                                               (1 Point)                  [ ] Post-partum < 6 weeks                                   (1 Point)             X  BMI > 25 Kg/m2                                            (1 Point)                  [ ] Hormonal therapy  or oral contraception            (1 Point)                 [ ] Sepsis (in the previous month)                        (1 Point)                  [ ] History of pregnancy complications  [ ] Pneumonia or serious lung disease                                               [ ] Unexplained or recurrent                       (1 Point)           (in the previous month)                               (1 Point)  [ ] Abnormal pulmonary function test                     (1 Point)                 SURGERY RELATED RISK FACTORS  [ ] Acute myocardial infarction                              (1 Point)                 [ ]  Section                                            (1 Point)  [ ] Congestive heart failure (in the previous month)  (1 Point)                 [ ] Minor surgery                                                 (1 Point)   [ ] Inflammatory bowel disease                             (1 Point)                 [ ] Arthroscopic surgery                                        (2 Points)  [ ] Central venous access                                    (2 Points)                 X General surgery lasting more than 45 minutes   (2 Points)       [ ] Stroke (in the previous month)                          (5 Points)               [ ] Elective arthroplasty                                        (5 Points)            [ ] malignancy                                                             (2 points)                                                                                                                                 HEMATOLOGY RELATED FACTORS                                                 TRAUMA RELATED RISK FACTORS  [ ] Prior episodes of VTE                                     (3 Points)                 [ ] Fracture of the hip, pelvis, or leg                       (5 Points)  [ ] Positive family history for VTE                         (3 Points)                 [ ] Acute spinal cord injury (in the previous month)  (5 Points)  [ ] Prothrombin 75432 A                                      (3 Points)                 [ ] Paralysis  (less than 1 month)                          (5 Points)  [ ] Factor V Leiden                                             (3 Points)                 [ ] Multiple Trauma within 1 month                         (5 Points)  [ ] Lupus anticoagulants                                     (3 Points)                                                           [ ] Anticardiolipin antibodies                                (3 Points)                                                       [ ] High homocysteine in the blood                      (3 Points)                                             [ ] Other congenital or acquired thrombophilia       (3 Points)                                                [ ] Heparin induced thrombocytopenia                  (3 Points)                                          Total Score: 3    The Caprini score indicates this patient is at risk for a VTE event (score 3-5).  Most surgical patients in this group would benefit from pharmacologic prophylaxis.  The surgical team will determine the balance between VTE risk and bleeding risk

## 2021-08-06 ENCOUNTER — APPOINTMENT (OUTPATIENT)
Dept: CARDIOLOGY | Facility: CLINIC | Age: 31
End: 2021-08-06
Payer: COMMERCIAL

## 2021-08-06 DIAGNOSIS — Z01.818 ENCOUNTER FOR OTHER PREPROCEDURAL EXAMINATION: ICD-10-CM

## 2021-08-06 DIAGNOSIS — E66.01 MORBID (SEVERE) OBESITY DUE TO EXCESS CALORIES: ICD-10-CM

## 2021-08-06 PROCEDURE — 93306 TTE W/DOPPLER COMPLETE: CPT

## 2021-08-10 ENCOUNTER — APPOINTMENT (OUTPATIENT)
Dept: DISASTER EMERGENCY | Facility: CLINIC | Age: 31
End: 2021-08-10

## 2021-08-10 ENCOUNTER — NON-APPOINTMENT (OUTPATIENT)
Age: 31
End: 2021-08-10

## 2021-08-10 DIAGNOSIS — Z01.818 ENCOUNTER FOR OTHER PREPROCEDURAL EXAMINATION: ICD-10-CM

## 2021-08-10 LAB — SARS-COV-2 N GENE NPH QL NAA+PROBE: NOT DETECTED

## 2021-08-10 NOTE — ADDENDUM
[FreeTextEntry1] : \par ADDENDUM (8/10/2021):\par \par Echocardiography on August 6, 2021 revealed normal left ventricular size and function with estimated ejection fraction 59%, normal right ventricular size and function, minimal tricuspid regurgitation, and normal estimated pulmonary artery pressure.\par \par * Ms Celestin appears stable from a cardiac standpoint to proceed with upcoming bariatric surgical procedure.

## 2021-08-10 NOTE — DISCUSSION/SUMMARY
[FreeTextEntry1] : \par Preoperative cardiac examination: Tracey appears to be a good candidate for bariatric surgery and resting 12-lead ECG appears normal. She has no past history of heart disease but describes the presence of a cardiac murmur -- I recommend preoperative echocardiography to assess for the presence of structural heart disease (and also to assess for the presence of pulmonary hypertension in the setting of obesity and untreated obstructive sleep apnea).\par \par Asthma: Patient describes chronic, stable, intermittent asthma -- recently saw Dr. Nelson for pulmonary consultation prior to surgery; she will continue using Symbicort bronchial inhaler.

## 2021-08-10 NOTE — REVIEW OF SYSTEMS
[Joint Pain] : joint pain [Negative] : Heme/Lymph [SOB] : no shortness of breath [Chest Discomfort] : no chest discomfort [Syncope] : no syncope

## 2021-08-10 NOTE — HISTORY OF PRESENT ILLNESS
[FreeTextEntry1] : Tracey Celestin is a 31-year-old woman with a history of obstructive sleep apnea (untreated), anxiety, depression, obstructive sleep apnea, asthma, former tobacco use, obesity, and right distal fibular fracture following skateboarding accident in 2019 who presents for preoperative cardiac examination prior to bariatric surgery - scheduled for a laparoscopic sleeve gastrectomy with Dr. Aguiar in mid August. She has no past history of heart disease but describes the presence of a cardiac murmur -- diagnosed several years ago but she says she never underwent cardiac imaging. She describes a good baseline functional status but has difficulty exercising because of persistent ankle pain.  She denies angina.

## 2021-08-10 NOTE — PHYSICAL EXAM
[Normal S1, S2] : normal S1, S2 [Clear Lung Fields] : clear lung fields [Soft] : abdomen soft [Normal Bowel Sounds] : normal bowel sounds [Normal Gait] : normal gait [No Edema] : no edema [No Rash] : no rash [Moves all extremities] : moves all extremities [Alert and Oriented] : alert and oriented [de-identified] : Morbidly obese with BMI approximately 43 [de-identified] : Wearing a face mask [de-identified] : Pupils are round [de-identified] : No JVD [de-identified] : I/VI systolic murmur

## 2021-08-13 ENCOUNTER — RESULT REVIEW (OUTPATIENT)
Age: 31
End: 2021-08-13

## 2021-08-13 ENCOUNTER — INPATIENT (INPATIENT)
Facility: HOSPITAL | Age: 31
LOS: 0 days | Discharge: HOME | DRG: 621 | End: 2021-08-14
Attending: SURGERY | Admitting: SURGERY
Payer: COMMERCIAL

## 2021-08-13 VITALS
DIASTOLIC BLOOD PRESSURE: 63 MMHG | SYSTOLIC BLOOD PRESSURE: 102 MMHG | RESPIRATION RATE: 16 BRPM | OXYGEN SATURATION: 99 % | HEART RATE: 70 BPM | WEIGHT: 250 LBS | TEMPERATURE: 98 F | HEIGHT: 65 IN

## 2021-08-13 DIAGNOSIS — E66.01 MORBID (SEVERE) OBESITY DUE TO EXCESS CALORIES: ICD-10-CM

## 2021-08-13 DIAGNOSIS — Z98.890 OTHER SPECIFIED POSTPROCEDURAL STATES: Chronic | ICD-10-CM

## 2021-08-13 LAB — HCG UR QL: NEGATIVE — SIGNIFICANT CHANGE UP

## 2021-08-13 PROCEDURE — 85025 COMPLETE CBC W/AUTO DIFF WBC: CPT

## 2021-08-13 PROCEDURE — 80048 BASIC METABOLIC PNL TOTAL CA: CPT

## 2021-08-13 PROCEDURE — C9399: CPT

## 2021-08-13 PROCEDURE — 88307 TISSUE EXAM BY PATHOLOGIST: CPT | Mod: 26

## 2021-08-13 PROCEDURE — C1889: CPT

## 2021-08-13 PROCEDURE — C9113: CPT

## 2021-08-13 PROCEDURE — 88307 TISSUE EXAM BY PATHOLOGIST: CPT

## 2021-08-13 PROCEDURE — 81025 URINE PREGNANCY TEST: CPT

## 2021-08-13 PROCEDURE — 36415 COLL VENOUS BLD VENIPUNCTURE: CPT

## 2021-08-13 PROCEDURE — 99221 1ST HOSP IP/OBS SF/LOW 40: CPT

## 2021-08-13 PROCEDURE — 86769 SARS-COV-2 COVID-19 ANTIBODY: CPT

## 2021-08-13 PROCEDURE — 82962 GLUCOSE BLOOD TEST: CPT

## 2021-08-13 RX ORDER — ALBUTEROL 90 UG/1
2 AEROSOL, METERED ORAL
Qty: 0 | Refills: 0 | DISCHARGE

## 2021-08-13 RX ORDER — HYDROMORPHONE HYDROCHLORIDE 2 MG/ML
0.5 INJECTION INTRAMUSCULAR; INTRAVENOUS; SUBCUTANEOUS
Refills: 0 | Status: DISCONTINUED | OUTPATIENT
Start: 2021-08-13 | End: 2021-08-13

## 2021-08-13 RX ORDER — ONDANSETRON 8 MG/1
4 TABLET, FILM COATED ORAL ONCE
Refills: 0 | Status: DISCONTINUED | OUTPATIENT
Start: 2021-08-13 | End: 2021-08-13

## 2021-08-13 RX ORDER — APREPITANT 80 MG/1
80 CAPSULE ORAL ONCE
Refills: 0 | Status: COMPLETED | OUTPATIENT
Start: 2021-08-13 | End: 2021-08-13

## 2021-08-13 RX ORDER — FENTANYL CITRATE 50 UG/ML
50 INJECTION INTRAVENOUS
Refills: 0 | Status: DISCONTINUED | OUTPATIENT
Start: 2021-08-13 | End: 2021-08-13

## 2021-08-13 RX ORDER — OXYCODONE HYDROCHLORIDE 5 MG/1
10 TABLET ORAL EVERY 4 HOURS
Refills: 0 | Status: DISCONTINUED | OUTPATIENT
Start: 2021-08-13 | End: 2021-08-14

## 2021-08-13 RX ORDER — ENOXAPARIN SODIUM 100 MG/ML
40 INJECTION SUBCUTANEOUS EVERY 12 HOURS
Refills: 0 | Status: DISCONTINUED | OUTPATIENT
Start: 2021-08-13 | End: 2021-08-14

## 2021-08-13 RX ORDER — MEPERIDINE HYDROCHLORIDE 50 MG/ML
12.5 INJECTION INTRAMUSCULAR; INTRAVENOUS; SUBCUTANEOUS
Refills: 0 | Status: DISCONTINUED | OUTPATIENT
Start: 2021-08-13 | End: 2021-08-13

## 2021-08-13 RX ORDER — OXYCODONE HYDROCHLORIDE 5 MG/1
5 TABLET ORAL EVERY 4 HOURS
Refills: 0 | Status: DISCONTINUED | OUTPATIENT
Start: 2021-08-13 | End: 2021-08-14

## 2021-08-13 RX ORDER — ACETAMINOPHEN 500 MG
1000 TABLET ORAL ONCE
Refills: 0 | Status: COMPLETED | OUTPATIENT
Start: 2021-08-13 | End: 2021-08-13

## 2021-08-13 RX ORDER — PANTOPRAZOLE SODIUM 20 MG/1
40 TABLET, DELAYED RELEASE ORAL EVERY 24 HOURS
Refills: 0 | Status: DISCONTINUED | OUTPATIENT
Start: 2021-08-13 | End: 2021-08-14

## 2021-08-13 RX ORDER — KETOROLAC TROMETHAMINE 30 MG/ML
30 SYRINGE (ML) INJECTION EVERY 6 HOURS
Refills: 0 | Status: DISCONTINUED | OUTPATIENT
Start: 2021-08-13 | End: 2021-08-14

## 2021-08-13 RX ORDER — VORTIOXETINE 5 MG/1
1 TABLET, FILM COATED ORAL
Qty: 0 | Refills: 0 | DISCHARGE

## 2021-08-13 RX ORDER — HEPARIN SODIUM 5000 [USP'U]/ML
5000 INJECTION INTRAVENOUS; SUBCUTANEOUS ONCE
Refills: 0 | Status: COMPLETED | OUTPATIENT
Start: 2021-08-13 | End: 2021-08-13

## 2021-08-13 RX ORDER — ONDANSETRON 8 MG/1
4 TABLET, FILM COATED ORAL EVERY 6 HOURS
Refills: 0 | Status: DISCONTINUED | OUTPATIENT
Start: 2021-08-13 | End: 2021-08-14

## 2021-08-13 RX ORDER — ACETAMINOPHEN 500 MG
1000 TABLET ORAL ONCE
Refills: 0 | Status: DISCONTINUED | OUTPATIENT
Start: 2021-08-13 | End: 2021-08-14

## 2021-08-13 RX ORDER — SODIUM CHLORIDE 9 MG/ML
1000 INJECTION, SOLUTION INTRAVENOUS
Refills: 0 | Status: DISCONTINUED | OUTPATIENT
Start: 2021-08-13 | End: 2021-08-13

## 2021-08-13 RX ORDER — ALBUTEROL 90 UG/1
2 AEROSOL, METERED ORAL EVERY 6 HOURS
Refills: 0 | Status: DISCONTINUED | OUTPATIENT
Start: 2021-08-13 | End: 2021-08-14

## 2021-08-13 RX ORDER — SODIUM CHLORIDE 9 MG/ML
1000 INJECTION, SOLUTION INTRAVENOUS
Refills: 0 | Status: DISCONTINUED | OUTPATIENT
Start: 2021-08-13 | End: 2021-08-14

## 2021-08-13 RX ORDER — BUPROPION HYDROCHLORIDE 150 MG/1
1 TABLET, EXTENDED RELEASE ORAL
Qty: 0 | Refills: 0 | DISCHARGE

## 2021-08-13 RX ADMIN — Medication 30 MILLIGRAM(S): at 17:23

## 2021-08-13 RX ADMIN — Medication 0.5 MILLIGRAM(S): at 11:40

## 2021-08-13 RX ADMIN — HYDROMORPHONE HYDROCHLORIDE 0.5 MILLIGRAM(S): 2 INJECTION INTRAMUSCULAR; INTRAVENOUS; SUBCUTANEOUS at 11:40

## 2021-08-13 RX ADMIN — Medication 400 MILLIGRAM(S): at 23:23

## 2021-08-13 RX ADMIN — APREPITANT 80 MILLIGRAM(S): 80 CAPSULE ORAL at 07:42

## 2021-08-13 RX ADMIN — Medication 1000 MILLIGRAM(S): at 17:23

## 2021-08-13 RX ADMIN — PANTOPRAZOLE SODIUM 40 MILLIGRAM(S): 20 TABLET, DELAYED RELEASE ORAL at 11:15

## 2021-08-13 RX ADMIN — Medication 30 MILLIGRAM(S): at 23:25

## 2021-08-13 RX ADMIN — Medication 1000 MILLIGRAM(S): at 23:25

## 2021-08-13 RX ADMIN — HYDROMORPHONE HYDROCHLORIDE 0.5 MILLIGRAM(S): 2 INJECTION INTRAMUSCULAR; INTRAVENOUS; SUBCUTANEOUS at 11:15

## 2021-08-13 RX ADMIN — HYDROMORPHONE HYDROCHLORIDE 0.5 MILLIGRAM(S): 2 INJECTION INTRAMUSCULAR; INTRAVENOUS; SUBCUTANEOUS at 12:00

## 2021-08-13 RX ADMIN — ENOXAPARIN SODIUM 40 MILLIGRAM(S): 100 INJECTION SUBCUTANEOUS at 23:23

## 2021-08-13 RX ADMIN — Medication 400 MILLIGRAM(S): at 17:22

## 2021-08-13 RX ADMIN — HEPARIN SODIUM 5000 UNIT(S): 5000 INJECTION INTRAVENOUS; SUBCUTANEOUS at 07:42

## 2021-08-13 RX ADMIN — Medication 30 MILLIGRAM(S): at 23:23

## 2021-08-13 RX ADMIN — ONDANSETRON 4 MILLIGRAM(S): 8 TABLET, FILM COATED ORAL at 18:55

## 2021-08-13 RX ADMIN — Medication 30 MILLIGRAM(S): at 17:22

## 2021-08-13 NOTE — BRIEF OPERATIVE NOTE - OPERATION/FINDINGS
Sleeve gastrectomy completed over 38Fr bougie  Hiatal hernia repaired primarily  Bilateral TAP block

## 2021-08-13 NOTE — BRIEF OPERATIVE NOTE - NSICDXBRIEFPROCEDURE_GEN_ALL_CORE_FT
PROCEDURES:  Laparoscopic sleeve gastrectomy 13-Aug-2021 11:29:47  Rahul Callahan  Laparoscopic repair of sliding hiatal hernia 13-Aug-2021 11:30:10  Rahul Callahan, transversus abdominis plane, bilateral 13-Aug-2021 11:30:24  Rahul Callahan   PROCEDURES:  Gastrectomy, sleeve, laparoscopic, with intraoperative EGD 13-Aug-2021 16:15:11  Rahul Callahan  Laparoscopic repair of sliding hiatal hernia 13-Aug-2021 11:30:10  Rahul Callahan  Block, transversus abdominis plane, bilateral 13-Aug-2021 11:30:24  Rahul Callahan

## 2021-08-13 NOTE — CONSULT NOTE ADULT - SUBJECTIVE AND OBJECTIVE BOX
PCP: Dr. Hannah Zapata    CHIEF COMPLAINT:  Morbid Obesity    HISTORY OF THE PRESENT ILLNESS: This a 32yo female with PMH: depression, anxiety, intermittent asthma, morbid obesity who failed diet, exercise and usual modalities for weight loss and is now S/P Gastric Sleeve.  Patient is seen in PACU, in NAD, denies any CP or SOB. We are consulted for Medical management    PAST MEDICAL HISTORY: as above    PAST SURGICAL HISTORY:   - s/p ORIF right ankle and fibula fracture  - surgical biopsy right ankle    FAMILY HISTORY:     Father- alive, heart disease, pulmonary embolism  Mother- alive, no known medical conditions  Maternal grandmother- breast cancer, heart disease    SOCIAL HISTORY: active smoker (cigarettes) 0.25ppd x 10yrs, social alcohol, medical marijuana    ALLERGIES: red dye, pasta, trix cereal     HOME MEDS: see med rec    REVIEW OF SYSTEMS:   All 10 systems reviewed in detailed and found to be negative with the exception of what has already been described above    MEDICATIONS  (STANDING):  lactated ringers. 1000 milliLiter(s) (75 mL/Hr) IV Continuous <Continuous>  pantoprazole  Injectable 40 milliGRAM(s) IV Push every 24 hours    MEDICATIONS  (PRN):  fentaNYL    Injectable 50 MICROGram(s) IV Push every 5 minutes PRN Moderate Pain (4 - 6)  HYDROmorphone  Injectable 0.5 milliGRAM(s) IV Push every 10 minutes PRN Severe Pain (7 - 10)  LORazepam   Injectable 0.5 milliGRAM(s) IV Push every 6 hours PRN Anxiety  meperidine     Injectable 12.5 milliGRAM(s) IV Push every 10 minutes PRN Shivering  ondansetron Injectable 4 milliGRAM(s) IV Push once PRN Nausea and/or Vomiting    VITALS:  T(F): 97.9 (08-13-21 @ 11:10), Max: 97.9 (08-13-21 @ 11:10)  HR: 68 (08-13-21 @ 12:15) (65 - 77)  BP: 110/52 (08-13-21 @ 12:15) (102/63 - 120/58)  RR: 15 (08-13-21 @ 12:15) (14 - 17)  SpO2: 100% (08-13-21 @ 12:15) (99% - 100%)  Wt(kg): --    I&O's Summary  13 Aug 2021 07:01  -  13 Aug 2021 13:01  --------------------------------------------------------  IN: 700 mL / OUT: 0 mL / NET: 700 mL    CAPILLARY BLOOD GLUCOSE  POCT Blood Glucose.: 111 mg/dL (13 Aug 2021 11:14)  POCT Blood Glucose.: 93 mg/dL (13 Aug 2021 08:01)    PHYSICAL EXAM:  HEENT:  pupils equal and reactive, EOMI  NECK:   supple, no palpable lymph nodes  CV: +S1, +S2, regular, no murmurs or rubs  RESP: lungs clear to auscultation bilaterally, no wheezing, rales, rhonchi,   BREAST:  not examined  GI:  + 6 lap incisions with dermabond, no bleeding/discharge noted. abdomen soft, non-tender, non-distended, hypoactive BS  RECTAL:  not examined  :  not examined  MSK:  normal muscle tone, no atrophy  EXT: no cyanosis, no edema, no calf pain, swelling or erythema  VASCULAR:  pulses equal and symmetric in the upper and lower extremities  NEURO:  AAOX3, no focal neurological deficits, follows all commands, able to move extremities spontaneously  SKIN:  no ulcers, lesions or rashes    IMPRESSION: 32yo female with above pmh a/w:  #Morbid Obesity  # S/P lap sleeve gastrectomy and sliding hiatal hernia repair  - POD#0  - pain control  - IVF's  - cl liqs per bariatric protocol  - Monitor CBC/BMP  - BGMs with SS  - protonix IVP    # TIMA (not on CPAP)  - monitor O2 sats  - supplemental O2 via NC prn    #intermittent asthma  -   - no previous hospitalizations as per pt chart    #VTE prophylaxis  - Lovenox  - venodynes BLE  - AMB    Thank you for the consult, will follow with you             PCP: Dr. Hannah Zapata    CHIEF COMPLAINT:  Morbid Obesity    HISTORY OF THE PRESENT ILLNESS: This a 30yo female with PMH: depression, anxiety, intermittent asthma, morbid obesity who failed diet, exercise and usual modalities for weight loss and is now S/P Gastric Sleeve.  Patient is seen in PACU, in NAD, c/o abdominal soreness and feeling tired. Denies chest pain, SOB, nausea. We are consulted for Medical management    PAST MEDICAL HISTORY: as above    PAST SURGICAL HISTORY:   - s/p ORIF right ankle and fibula fracture  - surgical biopsy right ankle    FAMILY HISTORY:     Father- alive, heart disease, pulmonary embolism  Mother- alive, no known medical conditions  Maternal grandmother- breast cancer, heart disease    SOCIAL HISTORY: active smoker (cigarettes) 0.25ppd x 10yrs, social alcohol, medical marijuana    ALLERGIES: red dye, pasta, trix cereal     HOME MEDS: see med rec    REVIEW OF SYSTEMS:   All 10 systems reviewed in detailed and found to be negative with the exception of what has already been described above    MEDICATIONS  (STANDING):  lactated ringers. 1000 milliLiter(s) (75 mL/Hr) IV Continuous <Continuous>  pantoprazole  Injectable 40 milliGRAM(s) IV Push every 24 hours    MEDICATIONS  (PRN):  fentaNYL    Injectable 50 MICROGram(s) IV Push every 5 minutes PRN Moderate Pain (4 - 6)  HYDROmorphone  Injectable 0.5 milliGRAM(s) IV Push every 10 minutes PRN Severe Pain (7 - 10)  LORazepam   Injectable 0.5 milliGRAM(s) IV Push every 6 hours PRN Anxiety  meperidine     Injectable 12.5 milliGRAM(s) IV Push every 10 minutes PRN Shivering  ondansetron Injectable 4 milliGRAM(s) IV Push once PRN Nausea and/or Vomiting    VITALS:  T(F): 97.9 (08-13-21 @ 11:10), Max: 97.9 (08-13-21 @ 11:10)  HR: 68 (08-13-21 @ 12:15) (65 - 77)  BP: 110/52 (08-13-21 @ 12:15) (102/63 - 120/58)  RR: 15 (08-13-21 @ 12:15) (14 - 17)  SpO2: 100% (08-13-21 @ 12:15) (99% - 100%)  Wt(kg): --    I&O's Summary  13 Aug 2021 07:01  -  13 Aug 2021 13:01  --------------------------------------------------------  IN: 700 mL / OUT: 0 mL / NET: 700 mL    CAPILLARY BLOOD GLUCOSE  POCT Blood Glucose.: 111 mg/dL (13 Aug 2021 11:14)  POCT Blood Glucose.: 93 mg/dL (13 Aug 2021 08:01)    PHYSICAL EXAM:  HEENT:  pupils equal and reactive, EOMI  NECK:   supple, no palpable lymph nodes  CV: +S1, +S2, regular, no murmurs or rubs  RESP: lungs clear to auscultation bilaterally, no wheezing, rales, rhonchi,   BREAST:  not examined  GI:  + 6 lap incisions with dermabond, no bleeding/discharge noted. abdomen soft, non-tender, non-distended, hypoactive BS  RECTAL:  not examined  :  not examined  MSK:  normal muscle tone, no atrophy  EXT: no cyanosis, no edema, no calf pain, swelling or erythema  VASCULAR:  pulses equal and symmetric in the upper and lower extremities  NEURO:  AAOX3, no focal neurological deficits, follows all commands, able to move extremities spontaneously  SKIN:  no ulcers, lesions or rashes    IMPRESSION: 30yo female with above pmh a/w:  #Morbid Obesity  # S/P lap sleeve gastrectomy and sliding hiatal hernia repair  - POD#0  - pain control  - IVF's  - cl liqs per bariatric protocol  - Monitor CBC/BMP  - BGMs with SS  - protonix IVP    # TIMA (not on CPAP)  - monitor O2 sats  - supplemental O2 via NC prn    #intermittent asthma  -   - no previous hospitalizations as per pt chart    #VTE prophylaxis  - Lovenox  - venodynes BLE  - AMB    Thank you for the consult, will follow with you             PCP: Dr. Hannah Zapata    CHIEF COMPLAINT:  Morbid Obesity    HISTORY OF THE PRESENT ILLNESS: This a 30yo female with PMH: depression, anxiety, intermittent asthma, morbid obesity who failed diet, exercise and usual modalities for weight loss and is now S/P Gastric Sleeve.  Patient is seen in PACU, in NAD, c/o abdominal soreness and feeling tired. Denies chest pain, SOB, nausea. We are consulted for Medical management    PAST MEDICAL HISTORY: as above    PAST SURGICAL HISTORY:   - s/p ORIF right ankle and fibula fracture  - surgical biopsy right ankle    FAMILY HISTORY:     Father- alive, heart disease, pulmonary embolism  Mother- alive, no known medical conditions  Maternal grandmother- breast cancer, heart disease    SOCIAL HISTORY: current smoker (cigarettes) 0.25ppd x 10yrs, social alcohol, medical marijuana    ALLERGIES: red dye, pasta, trix cereal     HOME MEDS: see med rec    REVIEW OF SYSTEMS:   All 10 systems reviewed in detailed and found to be negative with the exception of what has already been described above    MEDICATIONS  (STANDING):  lactated ringers. 1000 milliLiter(s) (75 mL/Hr) IV Continuous <Continuous>  pantoprazole  Injectable 40 milliGRAM(s) IV Push every 24 hours    MEDICATIONS  (PRN):  fentaNYL    Injectable 50 MICROGram(s) IV Push every 5 minutes PRN Moderate Pain (4 - 6)  HYDROmorphone  Injectable 0.5 milliGRAM(s) IV Push every 10 minutes PRN Severe Pain (7 - 10)  LORazepam   Injectable 0.5 milliGRAM(s) IV Push every 6 hours PRN Anxiety  meperidine     Injectable 12.5 milliGRAM(s) IV Push every 10 minutes PRN Shivering  ondansetron Injectable 4 milliGRAM(s) IV Push once PRN Nausea and/or Vomiting    VITALS:  T(F): 97.9 (08-13-21 @ 11:10), Max: 97.9 (08-13-21 @ 11:10)  HR: 68 (08-13-21 @ 12:15) (65 - 77)  BP: 110/52 (08-13-21 @ 12:15) (102/63 - 120/58)  RR: 15 (08-13-21 @ 12:15) (14 - 17)  SpO2: 100% (08-13-21 @ 12:15) (99% - 100%)  Wt(kg): --    I&O's Summary  13 Aug 2021 07:01  -  13 Aug 2021 13:01  --------------------------------------------------------  IN: 700 mL / OUT: 0 mL / NET: 700 mL    CAPILLARY BLOOD GLUCOSE  POCT Blood Glucose.: 111 mg/dL (13 Aug 2021 11:14)  POCT Blood Glucose.: 93 mg/dL (13 Aug 2021 08:01)    PHYSICAL EXAM:  HEENT:  pupils equal and reactive, EOMI  NECK:   supple, no palpable lymph nodes  CV: +S1, +S2, regular, no murmurs or rubs  RESP: lungs clear to auscultation bilaterally, no wheezing, rales, rhonchi,   BREAST:  not examined  GI:  + 6 lap incisions with dermabond, no bleeding/discharge noted. abdomen soft, non-tender, non-distended, hypoactive BS  RECTAL:  not examined  :  not examined  MSK:  normal muscle tone, no atrophy  EXT: no cyanosis, no edema, no calf pain, swelling or erythema  VASCULAR:  pulses equal and symmetric in the upper and lower extremities  NEURO:  AAOX3, no focal neurological deficits, follows all commands, able to move extremities spontaneously  SKIN:  no ulcers, lesions or rashes    IMPRESSION: 30yo female with above pmh a/w:  #Morbid Obesity  # S/P lap sleeve gastrectomy and sliding hiatal hernia repair  - POD#0  - pain control  - IVF's  - cl liqs per bariatric protocol  - Monitor CBC/BMP  - BGMs with SS  - protonix IVP    # TIMA (not on CPAP)  - monitor O2 sats  - supplemental O2 at night via NC prn    #intermittent asthma  - cont albuterol inh  - no previous hospitalizations     #depression/anxiety  - cont wellbutrin, trintillex    #VTE prophylaxis  - Lovenox  - venodynes BLE  - AMB    Thank you for the consult, will follow with you             PCP: Dr. Hannah Zapata    CHIEF COMPLAINT:  Morbid Obesity    HISTORY OF THE PRESENT ILLNESS: This a 32yo female with PMH: depression, anxiety, intermittent asthma, morbid obesity who failed diet, exercise and usual modalities for weight loss and is now S/P Gastric Sleeve.  Patient is seen in PACU, in NAD, c/o abdominal soreness and feeling tired. Denies chest pain, SOB, nausea. We are consulted for Medical management    PAST MEDICAL HISTORY: as above    PAST SURGICAL HISTORY:   - s/p ORIF right ankle and fibula fracture  - surgical biopsy right ankle    FAMILY HISTORY:     Father- alive, heart disease, pulmonary embolism  Mother- alive, no known medical conditions  Maternal grandmother- breast cancer, heart disease    SOCIAL HISTORY: smoker (cigarettes) 0.25ppd x 10yrs, quit yesterday. denies nicotine patch, social alcohol, medical marijuana every 2 weeks for chronic ankle pain    ALLERGIES: red dye, pasta, trix cereal     HOME MEDS: see med rec    REVIEW OF SYSTEMS:   All 10 systems reviewed in detailed and found to be negative with the exception of what has already been described above    MEDICATIONS  (STANDING):  lactated ringers. 1000 milliLiter(s) (75 mL/Hr) IV Continuous <Continuous>  pantoprazole  Injectable 40 milliGRAM(s) IV Push every 24 hours    MEDICATIONS  (PRN):  fentaNYL    Injectable 50 MICROGram(s) IV Push every 5 minutes PRN Moderate Pain (4 - 6)  HYDROmorphone  Injectable 0.5 milliGRAM(s) IV Push every 10 minutes PRN Severe Pain (7 - 10)  LORazepam   Injectable 0.5 milliGRAM(s) IV Push every 6 hours PRN Anxiety  meperidine     Injectable 12.5 milliGRAM(s) IV Push every 10 minutes PRN Shivering  ondansetron Injectable 4 milliGRAM(s) IV Push once PRN Nausea and/or Vomiting    VITALS:  T(F): 97.9 (08-13-21 @ 11:10), Max: 97.9 (08-13-21 @ 11:10)  HR: 68 (08-13-21 @ 12:15) (65 - 77)  BP: 110/52 (08-13-21 @ 12:15) (102/63 - 120/58)  RR: 15 (08-13-21 @ 12:15) (14 - 17)  SpO2: 100% (08-13-21 @ 12:15) (99% - 100%)  Wt(kg): --    I&O's Summary  13 Aug 2021 07:01  -  13 Aug 2021 13:01  --------------------------------------------------------  IN: 700 mL / OUT: 0 mL / NET: 700 mL    CAPILLARY BLOOD GLUCOSE  POCT Blood Glucose.: 111 mg/dL (13 Aug 2021 11:14)  POCT Blood Glucose.: 93 mg/dL (13 Aug 2021 08:01)    PHYSICAL EXAM:  HEENT:  pupils equal and reactive, EOMI  NECK:   supple, no palpable lymph nodes  CV: +S1, +S2, regular, no murmurs or rubs  RESP: lungs clear to auscultation bilaterally, no wheezing, rales, rhonchi,   BREAST:  not examined  GI:  + 6 lap incisions with dermabond, no bleeding/discharge noted. abdomen soft, non-tender, non-distended, hypoactive BS  RECTAL:  not examined  :  not examined  MSK:  normal muscle tone, no atrophy  EXT: no cyanosis, no edema, no calf pain, swelling or erythema  VASCULAR:  pulses equal and symmetric in the upper and lower extremities  NEURO:  AAOX3, no focal neurological deficits, follows all commands, able to move extremities spontaneously  SKIN:  no ulcers, lesions or rashes    IMPRESSION: 32yo female with above pmh a/w:  #Morbid Obesity  # S/P lap sleeve gastrectomy and sliding hiatal hernia repair  - POD#0  - pain control  - IVF's  - cl liqs per bariatric protocol  - Monitor CBC/BMP  - BGMs with SS  - protonix IVP    # TIMA (not on CPAP)  - monitor O2 sats  - supplemental O2 at night via NC prn    #intermittent asthma  - cont albuterol inh  - no previous hospitalizations, last asthma exacerbation was yrs ago as per pt    #depression/anxiety  - cont wellbutrin, trintillex    #VTE prophylaxis  - Lovenox  - venodynes BLE  - AMB    Thank you for the consult, will follow with you             PCP: Dr. Hannah Zapata    CHIEF COMPLAINT:  Morbid Obesity    HISTORY OF THE PRESENT ILLNESS: This a 30yo female with PMH: depression, anxiety, intermittent asthma, morbid obesity who failed diet, exercise and usual modalities for weight loss and is now S/P Gastric Sleeve.  Patient is seen in PACU, in NAD, c/o abdominal soreness and feeling tired. Denies chest pain, SOB, nausea. We are consulted for Medical management    PAST MEDICAL HISTORY: as above    PAST SURGICAL HISTORY:   - s/p ORIF right ankle and fibula fracture  - surgical biopsy right ankle    FAMILY HISTORY:     Father- alive, heart disease, pulmonary embolism  Mother- alive, no known medical conditions  Maternal grandmother- breast cancer, heart disease    SOCIAL HISTORY: smoker (cigarettes) 0.25ppd x 10yrs, quit yesterday. denies nicotine patch, social alcohol, medical marijuana every 2 weeks for chronic ankle pain    ALLERGIES: red dye, pasta, trix cereal     HOME MEDS: see med rec    REVIEW OF SYSTEMS:   All 10 systems reviewed in detailed and found to be negative with the exception of what has already been described above      VITALS:  T(F): 97.9 (08-13-21 @ 11:10), Max: 97.9 (08-13-21 @ 11:10)  HR: 68 (08-13-21 @ 12:15) (65 - 77)  BP: 110/52 (08-13-21 @ 12:15) (102/63 - 120/58)  RR: 15 (08-13-21 @ 12:15) (14 - 17)  SpO2: 100% (08-13-21 @ 12:15) (99% - 100%)      PHYSICAL EXAM:  HEENT:  pupils equal and reactive, EOMI  NECK:   supple, no palpable lymph nodes  CV: +S1, +S2, regular, no murmurs or rubs  RESP: lungs clear to auscultation bilaterally, no wheezing, rales, rhonchi,   BREAST:  not examined  GI:  + 6 lap incisions with dermabond, no bleeding/discharge noted. abdomen soft, non-tender, non-distended, hypoactive BS  RECTAL:  not examined  :  not examined  MSK:  normal muscle tone, no atrophy  EXT: no cyanosis, no edema, no calf pain, swelling or erythema  VASCULAR:  pulses equal and symmetric in the upper and lower extremities  NEURO:  AAOX3, no focal neurological deficits, follows all commands, able to move extremities spontaneously  SKIN:  no ulcers, lesions or rashes    MEDICATIONS  (STANDING):  lactated ringers. 1000 milliLiter(s) (75 mL/Hr) IV Continuous <Continuous>  pantoprazole  Injectable 40 milliGRAM(s) IV Push every 24 hours    MEDICATIONS  (PRN):  fentaNYL    Injectable 50 MICROGram(s) IV Push every 5 minutes PRN Moderate Pain (4 - 6)  HYDROmorphone  Injectable 0.5 milliGRAM(s) IV Push every 10 minutes PRN Severe Pain (7 - 10)  LORazepam   Injectable 0.5 milliGRAM(s) IV Push every 6 hours PRN Anxiety  meperidine     Injectable 12.5 milliGRAM(s) IV Push every 10 minutes PRN Shivering  ondansetron Injectable 4 milliGRAM(s) IV Push once PRN Nausea and/or Vomiting      IMPRESSION:     30yo female with above pmh a/w:  #Morbid Obesity  # S/P lap sleeve gastrectomy and sliding hiatal hernia repair  - POD#0  - pain control  - IVF's  - cl liqs per bariatric protocol  - Monitor CBC/BMP  - BGMs with SS  - protonix IVP    # TIMA (not on CPAP)  - monitor O2 sats  - supplemental O2 at night via NC prn    #intermittent asthma  - cont albuterol inh  - no previous hospitalizations, last asthma exacerbation was yrs ago as per pt    #depression/anxiety  - cont wellbutrin, trintillex    #VTE prophylaxis  - Lovenox  - venodynes BLE  - AMB    Thank you for the consult, will follow with you

## 2021-08-13 NOTE — BRIEF OPERATIVE NOTE - NSICDXBRIEFPOSTOP_GEN_ALL_CORE_FT
POST-OP DIAGNOSIS:  Obesity, morbid, BMI 40.0-49.9 13-Aug-2021 11:30:40  Rahul Callahan  Hiatal hernia 13-Aug-2021 11:30:51  Rahul Callahan

## 2021-08-13 NOTE — CONSULT NOTE ADULT - ATTENDING COMMENTS
Pt seen and examined in pacu. d/w SHAILA Peres. Agree with documentation as above with changes made where appropriate.   Pt admitted for bariatric surgery  [facility] clears per protocol  Ambulate when feasible.   Pain control.    thank you, will follow

## 2021-08-14 ENCOUNTER — TRANSCRIPTION ENCOUNTER (OUTPATIENT)
Age: 31
End: 2021-08-14

## 2021-08-14 VITALS
RESPIRATION RATE: 16 BRPM | SYSTOLIC BLOOD PRESSURE: 112 MMHG | OXYGEN SATURATION: 98 % | DIASTOLIC BLOOD PRESSURE: 62 MMHG | TEMPERATURE: 98 F | HEART RATE: 73 BPM

## 2021-08-14 LAB
ANION GAP SERPL CALC-SCNC: 6 MMOL/L — SIGNIFICANT CHANGE UP (ref 5–17)
BASOPHILS # BLD AUTO: 0.02 K/UL — SIGNIFICANT CHANGE UP (ref 0–0.2)
BASOPHILS NFR BLD AUTO: 0.2 % — SIGNIFICANT CHANGE UP (ref 0–2)
BUN SERPL-MCNC: 8 MG/DL — SIGNIFICANT CHANGE UP (ref 7–23)
CALCIUM SERPL-MCNC: 8.8 MG/DL — SIGNIFICANT CHANGE UP (ref 8.5–10.1)
CHLORIDE SERPL-SCNC: 108 MMOL/L — SIGNIFICANT CHANGE UP (ref 96–108)
CO2 SERPL-SCNC: 25 MMOL/L — SIGNIFICANT CHANGE UP (ref 22–31)
COVID-19 SPIKE DOMAIN AB INTERP: POSITIVE
COVID-19 SPIKE DOMAIN ANTIBODY RESULT: >250 U/ML — HIGH
CREAT SERPL-MCNC: 0.84 MG/DL — SIGNIFICANT CHANGE UP (ref 0.5–1.3)
EOSINOPHIL # BLD AUTO: 0 K/UL — SIGNIFICANT CHANGE UP (ref 0–0.5)
EOSINOPHIL NFR BLD AUTO: 0 % — SIGNIFICANT CHANGE UP (ref 0–6)
GLUCOSE SERPL-MCNC: 88 MG/DL — SIGNIFICANT CHANGE UP (ref 70–99)
HCT VFR BLD CALC: 38.5 % — SIGNIFICANT CHANGE UP (ref 34.5–45)
HGB BLD-MCNC: 13.3 G/DL — SIGNIFICANT CHANGE UP (ref 11.5–15.5)
IMM GRANULOCYTES NFR BLD AUTO: 0.5 % — SIGNIFICANT CHANGE UP (ref 0–1.5)
LYMPHOCYTES # BLD AUTO: 1.33 K/UL — SIGNIFICANT CHANGE UP (ref 1–3.3)
LYMPHOCYTES # BLD AUTO: 13.1 % — SIGNIFICANT CHANGE UP (ref 13–44)
MCHC RBC-ENTMCNC: 30.2 PG — SIGNIFICANT CHANGE UP (ref 27–34)
MCHC RBC-ENTMCNC: 34.5 GM/DL — SIGNIFICANT CHANGE UP (ref 32–36)
MCV RBC AUTO: 87.5 FL — SIGNIFICANT CHANGE UP (ref 80–100)
MONOCYTES # BLD AUTO: 0.69 K/UL — SIGNIFICANT CHANGE UP (ref 0–0.9)
MONOCYTES NFR BLD AUTO: 6.8 % — SIGNIFICANT CHANGE UP (ref 2–14)
NEUTROPHILS # BLD AUTO: 8.07 K/UL — HIGH (ref 1.8–7.4)
NEUTROPHILS NFR BLD AUTO: 79.4 % — HIGH (ref 43–77)
PLATELET # BLD AUTO: 275 K/UL — SIGNIFICANT CHANGE UP (ref 150–400)
POTASSIUM SERPL-MCNC: 4.3 MMOL/L — SIGNIFICANT CHANGE UP (ref 3.5–5.3)
POTASSIUM SERPL-SCNC: 4.3 MMOL/L — SIGNIFICANT CHANGE UP (ref 3.5–5.3)
RBC # BLD: 4.4 M/UL — SIGNIFICANT CHANGE UP (ref 3.8–5.2)
RBC # FLD: 12.2 % — SIGNIFICANT CHANGE UP (ref 10.3–14.5)
SARS-COV-2 IGG+IGM SERPL QL IA: >250 U/ML — HIGH
SARS-COV-2 IGG+IGM SERPL QL IA: POSITIVE
SODIUM SERPL-SCNC: 139 MMOL/L — SIGNIFICANT CHANGE UP (ref 135–145)
WBC # BLD: 10.16 K/UL — SIGNIFICANT CHANGE UP (ref 3.8–10.5)
WBC # FLD AUTO: 10.16 K/UL — SIGNIFICANT CHANGE UP (ref 3.8–10.5)

## 2021-08-14 PROCEDURE — 99232 SBSQ HOSP IP/OBS MODERATE 35: CPT

## 2021-08-14 RX ORDER — ACETAMINOPHEN 500 MG
1000 TABLET ORAL ONCE
Refills: 0 | Status: DISCONTINUED | OUTPATIENT
Start: 2021-08-14 | End: 2021-08-14

## 2021-08-14 RX ORDER — ACETAMINOPHEN 500 MG
1000 TABLET ORAL ONCE
Refills: 0 | Status: COMPLETED | OUTPATIENT
Start: 2021-08-14 | End: 2021-08-14

## 2021-08-14 RX ADMIN — PANTOPRAZOLE SODIUM 40 MILLIGRAM(S): 20 TABLET, DELAYED RELEASE ORAL at 12:34

## 2021-08-14 RX ADMIN — ENOXAPARIN SODIUM 40 MILLIGRAM(S): 100 INJECTION SUBCUTANEOUS at 09:40

## 2021-08-14 RX ADMIN — Medication 400 MILLIGRAM(S): at 05:50

## 2021-08-14 RX ADMIN — Medication 30 MILLIGRAM(S): at 05:51

## 2021-08-14 RX ADMIN — Medication 30 MILLIGRAM(S): at 12:33

## 2021-08-14 RX ADMIN — Medication 1000 MILLIGRAM(S): at 05:51

## 2021-08-14 RX ADMIN — Medication 30 MILLIGRAM(S): at 12:34

## 2021-08-14 RX ADMIN — Medication 30 MILLIGRAM(S): at 05:50

## 2021-08-14 NOTE — CHART NOTE - NSCHARTNOTEFT_GEN_A_CORE
Operative Note    Patient: Tracey Celestin  : May 8, 1990  Surgery date: 2021    Pre-Operative Diagnosis: Refractory morbid obesity with multiple comorbid conditions.    Post-operative Diagnosis: Same    Primary Procedure  [13579] Lap Longitudinal Gastrectomy     Secondary Procedure(s)  [01050] Uppr Gi Endoscopy, Diagnosis   [40870] Hiatal Hernia Repair - Laparoscopic   [82644] Transversus Abdominis Plan (TAP) Block Bilateral     Surgeon: Orlando Moya M.D., FACS   Assistant surgeon: Martha Pierce RPA     Anesthesia type: General Anesthesia     ICD9 Code   Diagnosis   [E03.9]   HYPOTHYROIDISM UNSPECIFIED (Stable)   [E66.01]   MORBID SEVERE OBES D/T EXCESS RICH (Stable)   [G47.33]   OBSTRUCTIVE SLEEP APNEA (Stable)   [J45]   ASTHMA (Stable)   [Z68.41]   BODY MASS INDEX 40.0-44.9 ADULT (Stable)     Specimens: Partial Gastrectomy sent to pathology    Estimated blood loss: 30 ml       DRAINS: NONE    COMPLICATIONS: NONE    INDICATIONS OF THE PROCEDURE: The patient is a 31-year-old female who has a body mass index of 42. The patient has multiple comorbidities due to her morbid obesity including obstructive sleep apnea. The patient has failed multiple nonoperative attempts at weight loss. The patient meets NIH criteria for bariatric surgery and underwent appropriate preoperative workup, education, and signed the consent form freely. The patient had risks and benefits explained including, but not limited to, bleeding, leak, infection, disability, injury to transient structures, aspiration, DVT, pulmonary embolism, and death. The patient understood and agreed to proceed with surgery.    DESCRIPTION OF PROCEDURE: The patient was identified properly via a time-out. The patient was brought into the operating room and was placed on the operating room table in supine position. The patient was then given general endotracheal anesthesia and was given preoperative antibiotics. Preoperative heparin was given in the ambulatory surgery unit. The patient was then prepped and draped in the usual sterile fashion. An Exparel mixture was mixed at the back table with 20 mL of Exparel, 30 mL of 0.25% Marcaine and 150 mL of normal saline. A Veress needle was placed in the left upper quadrant. The abdomen was insufflated to 15 millimeters of mercury. Once the abdomen was insufflated, the Exparel mixture was given subcutaneously at the sites of incision. An incision was made within the umbilicus and a 12-millimeter trocar was placed in the abdomen. The laparoscope was inserted and there was no injury on initial trocar placement or initial Veress needle placement. A Transversus Abdominus Plane Block was then conducted by placing 20 mL of the Exparel mixture preperitoneal at the distribution of the spinal nerves on the lateral abdominal wall. This was started at the costal margin at the mid axillary line. 20cc of the Exparel mixture was placed in this area. Another 20 mL was placed four centimeters caudal to this area. Another 20 mL was placed four centimeters caudal to this area and another 15 mL was placed four centimeters caudal to this area. This was repeated on the opposite side of the body in a similar fashion. The rest of the Exparel was placed into the subcutaneous tissues and preperitoneal at every trocar site. A 5-millimeter and 15-millimeter trocar was placed in the right upper quadrant. A 12-millimeter and 5-millimeter trocar was placed in left upper quadrant. An incision was made in subxiphoid area and a liver retractor was placed to hold the liver anteriorly and superiorly and was held in place using Mediflex device. The patient was then placed into steep reverse Trendelenburg position and a point along the stomach was then measured approximately 5 centimeters proximal to the pylorus and the greater curvature of the stomach was taken down from that point. The greater curvature was taken down all the way to the angle of His and the stomach was removed from the left crura using the LigaSure device. At this point, the stomach was completely mobilized. With the stomach off of the left crura, a moderate sized hiatal hernia was identified. The stomach is gently retracted into the abdomen to assess its degree of tethering in the thorax. The peritoneum overlying the right pau is incised, and the plane along the hernia sac is developed. The dissection is extended anteriorlyand laterally to the left pau. The base of the crural confluence is dissected free of adhesions. The hernia sac is carefully dissected into the mediastinum with caudal traction. The interfaces between the pleura, pericardium, spine, and aorta are developed as the dissection is carried cephalad to the top of the esophagus. The esophagus is identified and dissected circumferentially and along its mediastinal course in order to reduce tension, allowing the gastroesophageal junction to rest comfortably within the abdominal cavity. Care is taken to identify and preserve the vagus nerves. The retro-esophageal window is developed, and the esophagus is retracted caudally. The crural pillars are then approximated with a 2-0 non absorbable V-Loc suture in a running fashion. The tightness of the repair is gauged visually. Once the hiatal hernia was repaired, the bougie was inserted. A 40-Tristanian bougie was then placed into the stomach and placed into the antrum. At this point a 60 millimeter iDrive stapler with a tri-staple black load was used to start transecting the stomach along the bougie, approximately 5 more firings using the purple load were used to staple the stomach to wrap around the bougie to make a nice sleeve around the bougie. Once the stomach was completely transected, the stomach was placed into an EndoCatch bag and removed from the abdomen. A Vicryl suture was used to oversew the staple line in a Lembert fashion from the top of the suture line to about FDC down the sleeve gastrectomy in a continuous running fashion. Two sutures, 3-0 sutures were placed in the antrum of the stomach to the retroperitoneal fat so that the sleeve would not rotate. Hemostasis was achieved, the remaining stomach was placed under saline solution and an endoscopy was then conducted. There was no leak from the staple line. There was no bleeding or stricture at the staple line. Once the endoscopy was completed, complete hemostasis was assured. The 15-millimeter trocar was then removed and the fascia was closed with a 0 Vicryl suture using the suture passer. Upon completion of the procedure, the abdomen was desufflated and all trocars were removed. The skin was closed with 4-0 Monocryl running subcuticular sutures. The patient tolerated the procedure well.    Disposition  To recovery room, VS stable. Operative Note    Patient: Tracey Celestin  : May 8, 1990  Surgery date: 2021    Pre-Operative Diagnosis: Refractory morbid obesity with multiple comorbid conditions.    Post-operative Diagnosis: Same    Primary Procedure  [14307] Lap Longitudinal Gastrectomy     Secondary Procedure(s)  [40224] Uppr Gi Endoscopy, Diagnosis   [79820] Hiatal Hernia Repair - Laparoscopic   [29909] Transversus Abdominis Plan (TAP) Block Bilateral     Surgeon: Orlando Moya M.D., FACS   Assistant surgeon: Stanley Aguiar MD     Anesthesia type: General Anesthesia     ICD9 Code   Diagnosis   [E03.9]   HYPOTHYROIDISM UNSPECIFIED (Stable)   [E66.01]   MORBID SEVERE OBES D/T EXCESS RICH (Stable)   [G47.33]   OBSTRUCTIVE SLEEP APNEA (Stable)   [J45]   ASTHMA (Stable)   [Z68.41]   BODY MASS INDEX 40.0-44.9 ADULT (Stable)     Specimens: Partial Gastrectomy sent to pathology    Estimated blood loss: 30 ml       DRAINS: NONE    COMPLICATIONS: NONE    INDICATIONS OF THE PROCEDURE: The patient is a 31-year-old female who has a body mass index of 42. The patient has multiple comorbidities due to her morbid obesity including obstructive sleep apnea. The patient has failed multiple nonoperative attempts at weight loss. The patient meets NIH criteria for bariatric surgery and underwent appropriate preoperative workup, education, and signed the consent form freely. The patient had risks and benefits explained including, but not limited to, bleeding, leak, infection, disability, injury to transient structures, aspiration, DVT, pulmonary embolism, and death. The patient understood and agreed to proceed with surgery.    DESCRIPTION OF PROCEDURE: The patient was identified properly via a time-out. The patient was brought into the operating room and was placed on the operating room table in supine position. The patient was then given general endotracheal anesthesia and was given preoperative antibiotics. Preoperative heparin was given in the ambulatory surgery unit. The patient was then prepped and draped in the usual sterile fashion. An Exparel mixture was mixed at the back table with 20 mL of Exparel, 30 mL of 0.25% Marcaine and 150 mL of normal saline. A Veress needle was placed in the left upper quadrant. The abdomen was insufflated to 15 millimeters of mercury. Once the abdomen was insufflated, the Exparel mixture was given subcutaneously at the sites of incision. An incision was made within the umbilicus and a 12-millimeter trocar was placed in the abdomen. The laparoscope was inserted and there was no injury on initial trocar placement or initial Veress needle placement. A Transversus Abdominus Plane Block was then conducted by placing 20 mL of the Exparel mixture preperitoneal at the distribution of the spinal nerves on the lateral abdominal wall. This was started at the costal margin at the mid axillary line. 20cc of the Exparel mixture was placed in this area. Another 20 mL was placed four centimeters caudal to this area. Another 20 mL was placed four centimeters caudal to this area and another 15 mL was placed four centimeters caudal to this area. This was repeated on the opposite side of the body in a similar fashion. The rest of the Exparel was placed into the subcutaneous tissues and preperitoneal at every trocar site. A 5-millimeter and 15-millimeter trocar was placed in the right upper quadrant. A 12-millimeter and 5-millimeter trocar was placed in left upper quadrant. An incision was made in subxiphoid area and a liver retractor was placed to hold the liver anteriorly and superiorly and was held in place using Mediflex device. The patient was then placed into steep reverse Trendelenburg position and a point along the stomach was then measured approximately 5 centimeters proximal to the pylorus and the greater curvature of the stomach was taken down from that point. The greater curvature was taken down all the way to the angle of His and the stomach was removed from the left crura using the LigaSure device. At this point, the stomach was completely mobilized. With the stomach off of the left crura, a moderate sized hiatal hernia was identified. The stomach is gently retracted into the abdomen to assess its degree of tethering in the thorax. The peritoneum overlying the right pau is incised, and the plane along the hernia sac is developed. The dissection is extended anteriorlyand laterally to the left pau. The base of the crural confluence is dissected free of adhesions. The hernia sac is carefully dissected into the mediastinum with caudal traction. The interfaces between the pleura, pericardium, spine, and aorta are developed as the dissection is carried cephalad to the top of the esophagus. The esophagus is identified and dissected circumferentially and along its mediastinal course in order to reduce tension, allowing the gastroesophageal junction to rest comfortably within the abdominal cavity. Care is taken to identify and preserve the vagus nerves. The retro-esophageal window is developed, and the esophagus is retracted caudally. The crural pillars are then approximated with a 2-0 non absorbable V-Loc suture in a running fashion. The tightness of the repair is gauged visually. Once the hiatal hernia was repaired, the bougie was inserted. A 40-Korean bougie was then placed into the stomach and placed into the antrum. At this point a 60 millimeter iDrive stapler with a tri-staple black load was used to start transecting the stomach along the bougie, approximately 5 more firings using the purple load were used to staple the stomach to wrap around the bougie to make a nice sleeve around the bougie. Once the stomach was completely transected, the stomach was placed into an EndoCatch bag and removed from the abdomen. A Vicryl suture was used to oversew the staple line in a Lembert fashion from the top of the suture line to about custodial down the sleeve gastrectomy in a continuous running fashion. Two sutures, 3-0 sutures were placed in the antrum of the stomach to the retroperitoneal fat so that the sleeve would not rotate. Hemostasis was achieved, the remaining stomach was placed under saline solution and an endoscopy was then conducted. There was no leak from the staple line. There was no bleeding or stricture at the staple line. Once the endoscopy was completed, complete hemostasis was assured. The 15-millimeter trocar was then removed and the fascia was closed with a 0 Vicryl suture using the suture passer. Upon completion of the procedure, the abdomen was desufflated and all trocars were removed. The skin was closed with 4-0 Monocryl running subcuticular sutures. The patient tolerated the procedure well.    Disposition  To recovery room, VS stable.

## 2021-08-14 NOTE — DISCHARGE NOTE PROVIDER - NSDCMRMEDTOKEN_GEN_ALL_CORE_FT
ProAir HFA 90 mcg/inh inhalation aerosol: 2 puff(s) inhaled 4 times a day  Trintellix 10 mg oral tablet: 1 tab(s) orally once a day  Wellbutrin  mg/24 hours oral tablet, extended release: 1 tab(s) orally every 24 hours

## 2021-08-14 NOTE — DISCHARGE NOTE NURSING/CASE MANAGEMENT/SOCIAL WORK - PATIENT PORTAL LINK FT
You can access the FollowMyHealth Patient Portal offered by [facility] by registering at the following website: [email or web address]. By joining [employer]’s FollowMyHealth portal, you will also be able to view your health information using other applications (apps) compatible with our system.

## 2021-08-14 NOTE — PROGRESS NOTE ADULT - ASSESSMENT
The patient is s/p lap sleeve gastrectomy and doing very well.  All discharge instructions were given to the patient, as well as potential signs of complications.  The patient will follow up in 2 weeks.  [facility]

## 2021-08-14 NOTE — PROGRESS NOTE ADULT - SUBJECTIVE AND OBJECTIVE BOX
PCP: Dr. Hannah Zapata    CHIEF COMPLAINT:  Morbid Obesity    HISTORY OF THE PRESENT ILLNESS: This a 30yo female with PMH: depression, anxiety, intermittent asthma, morbid obesity who failed diet, exercise and usual modalities for weight loss and is now S/P Gastric Sleeve. Hospitalist service consulted for medical comanagement.     8/14: pt seen and examined this am. Doing well. Pain controlled. ambulating without difficulty. no sob/chest pain. tolerating vero clears.  REVIEW OF SYSTEMS:   All 10 systems reviewed in detailed and found to be negative with the exception of what has already been described above  Vital Signs Last 24 Hrs  T(C): 36.7 (14 Aug 2021 08:52), Max: 36.8 (14 Aug 2021 00:22)  T(F): 98 (14 Aug 2021 08:52), Max: 98.2 (14 Aug 2021 00:22)  HR: 73 (14 Aug 2021 08:52) (53 - 73)  BP: 112/62 (14 Aug 2021 08:52) (98/54 - 115/48)  RR: 16 (14 Aug 2021 08:52) (12 - 16)  SpO2: 98% (14 Aug 2021 08:52) (98% - 100%)    PHYSICAL EXAM:    GENERAL: Comfortable, no acute distress   HEAD:  Normocephalic, atraumatic  EYES: EOMI, PERRLA  HEENT: Moist mucous membranes  NECK: Supple, No JVD  NERVOUS SYSTEM:  Alert & Oriented X3, 5/5 power b/l  CHEST/LUNG: Clear to auscultation bilaterally  HEART: Regular rate and rhythm  ABDOMEN: Soft, obese, appropriate post op tenderness, lap sites intact with dermabond, bs+  GENITOURINARY: Voiding, no palpable bladder  EXTREMITIES:   No clubbing, cyanosis, or edema  MUSCULOSKELETAL- No muscle tenderness, no joint tenderness  SKIN-no rash  LABS:                        13.3   10.16 )-----------( 275      ( 14 Aug 2021 07:04 )             38.5     08-14    139  |  108  |  8   ----------------------------<  88  4.3   |  25  |  0.84    Ca    8.8      14 Aug 2021 07:04      MEDICATIONS  (STANDING):  acetaminophen  IVPB .. 1000 milliGRAM(s) IV Intermittent once  acetaminophen  IVPB .. 1000 milliGRAM(s) IV Intermittent once  enoxaparin Injectable 40 milliGRAM(s) SubCutaneous every 12 hours  ketorolac   Injectable 30 milliGRAM(s) IV Push every 6 hours  lactated ringers. 1000 milliLiter(s) (150 mL/Hr) IV Continuous <Continuous>  pantoprazole  Injectable 40 milliGRAM(s) IV Push every 24 hours    MEDICATIONS  (PRN):  ALBUTerol    90 MICROgram(s) HFA Inhaler 2 Puff(s) Inhalation every 6 hours PRN Shortness of Breath and/or Wheezing  LORazepam   Injectable 0.5 milliGRAM(s) IV Push every 6 hours PRN Anxiety  ondansetron Injectable 4 milliGRAM(s) IV Push every 6 hours PRN Nausea and/or Vomiting  oxyCODONE    Solution 5 milliGRAM(s) Oral every 4 hours PRN Mild Pain (1 - 3)  oxyCODONE    Solution 10 milliGRAM(s) Oral every 4 hours PRN Moderate Pain (4 - 6)    ASSESSMENT AND PLAN:  30yo female with above pmh a/w:    #Morbid Obesity  # S/P lap sleeve gastrectomy and sliding hiatal hernia repair  - pod#1  - pain control  - ambulate  - incentive spirometry  - vero clears per protocol.  - ivf  - PPI    # TIMA (not on CPAP)  - monitor O2 sats    #intermittent asthma  - cont albuterol prn    #depression/anxiety  - cont wellbutrin, trintillex once ok to take pills.    #VTE prophylaxis  - Lovenox        
Post Op Day#: 1  Procedure:  Laparoscopic Sleeve Gastrectomy    The patient is doing well without complaints.    Vital Signs Last 24 Hrs  T(C): 36.7 (14 Aug 2021 08:52), Max: 36.8 (14 Aug 2021 00:22)  T(F): 98 (14 Aug 2021 08:52), Max: 98.2 (14 Aug 2021 00:22)  HR: 73 (14 Aug 2021 08:52) (53 - 77)  BP: 112/62 (14 Aug 2021 08:52) (98/54 - 120/58)  BP(mean): --  RR: 16 (14 Aug 2021 08:52) (12 - 17)  SpO2: 98% (14 Aug 2021 08:52) (98% - 100%)    PHYSICAL EXAM:  General: NAD.  HEENT: no JVD, no jaundice.  LUNGS: CTAB.  Heart: S1 S2 RRR  Abd: soft nt/nd   Wounds: clean dry and intact                          13.3   10.16 )-----------( 275      ( 14 Aug 2021 07:04 )             38.5       08-14    139  |  108  |  8   ----------------------------<  88  4.3   |  25  |  0.84    Ca    8.8      14 Aug 2021 07:04

## 2021-08-14 NOTE — DISCHARGE NOTE PROVIDER - CARE PROVIDER_API CALL
Orlando Moya)  Surgery  [address], [address]  [place] [address]  Phone: [phone]  Fax: [phone]  Follow Up Time: 2 weeks

## 2021-08-14 NOTE — DISCHARGE NOTE PROVIDER - NSDCFUADDINST_GEN_ALL_CORE_FT
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

## 2021-08-16 ENCOUNTER — NON-APPOINTMENT (OUTPATIENT)
Age: 31
End: 2021-08-16

## 2021-08-19 DIAGNOSIS — F32.9 MAJOR DEPRESSIVE DISORDER, SINGLE EPISODE, UNSPECIFIED: ICD-10-CM

## 2021-08-19 DIAGNOSIS — E03.9 HYPOTHYROIDISM, UNSPECIFIED: ICD-10-CM

## 2021-08-19 DIAGNOSIS — K44.9 DIAPHRAGMATIC HERNIA WITHOUT OBSTRUCTION OR GANGRENE: ICD-10-CM

## 2021-08-19 DIAGNOSIS — E66.01 MORBID (SEVERE) OBESITY DUE TO EXCESS CALORIES: ICD-10-CM

## 2021-08-19 DIAGNOSIS — Z87.891 PERSONAL HISTORY OF NICOTINE DEPENDENCE: ICD-10-CM

## 2021-08-19 DIAGNOSIS — G47.33 OBSTRUCTIVE SLEEP APNEA (ADULT) (PEDIATRIC): ICD-10-CM

## 2021-08-19 DIAGNOSIS — J45.909 UNSPECIFIED ASTHMA, UNCOMPLICATED: ICD-10-CM

## 2021-08-23 ENCOUNTER — EMERGENCY (EMERGENCY)
Facility: HOSPITAL | Age: 31
LOS: 0 days | Discharge: HOME | End: 2021-08-23
Attending: EMERGENCY MEDICINE
Payer: COMMERCIAL

## 2021-08-23 VITALS
OXYGEN SATURATION: 100 % | HEART RATE: 80 BPM | TEMPERATURE: 98 F | DIASTOLIC BLOOD PRESSURE: 67 MMHG | RESPIRATION RATE: 16 BRPM | SYSTOLIC BLOOD PRESSURE: 110 MMHG

## 2021-08-23 VITALS — HEIGHT: 65 IN | WEIGHT: 240.08 LBS

## 2021-08-23 DIAGNOSIS — R63.8 OTHER SYMPTOMS AND SIGNS CONCERNING FOOD AND FLUID INTAKE: ICD-10-CM

## 2021-08-23 DIAGNOSIS — Z91.02 FOOD ADDITIVES ALLERGY STATUS: ICD-10-CM

## 2021-08-23 DIAGNOSIS — Z79.899 OTHER LONG TERM (CURRENT) DRUG THERAPY: ICD-10-CM

## 2021-08-23 DIAGNOSIS — J45.909 UNSPECIFIED ASTHMA, UNCOMPLICATED: ICD-10-CM

## 2021-08-23 DIAGNOSIS — R11.0 NAUSEA: ICD-10-CM

## 2021-08-23 DIAGNOSIS — Z86.59 PERSONAL HISTORY OF OTHER MENTAL AND BEHAVIORAL DISORDERS: ICD-10-CM

## 2021-08-23 DIAGNOSIS — Z20.822 CONTACT WITH AND (SUSPECTED) EXPOSURE TO COVID-19: ICD-10-CM

## 2021-08-23 DIAGNOSIS — Z98.890 OTHER SPECIFIED POSTPROCEDURAL STATES: Chronic | ICD-10-CM

## 2021-08-23 DIAGNOSIS — R10.9 UNSPECIFIED ABDOMINAL PAIN: ICD-10-CM

## 2021-08-23 DIAGNOSIS — Z91.018 ALLERGY TO OTHER FOODS: ICD-10-CM

## 2021-08-23 DIAGNOSIS — Z87.09 PERSONAL HISTORY OF OTHER DISEASES OF THE RESPIRATORY SYSTEM: ICD-10-CM

## 2021-08-23 DIAGNOSIS — N83.299 OTHER OVARIAN CYST, UNSPECIFIED SIDE: ICD-10-CM

## 2021-08-23 PROBLEM — Z88.9 ALLERGY STATUS TO UNSPECIFIED DRUGS, MEDICAMENTS AND BIOLOGICAL SUBSTANCES: Chronic | Status: ACTIVE | Noted: 2021-08-05

## 2021-08-23 PROBLEM — Z92.29 PERSONAL HISTORY OF OTHER DRUG THERAPY: Chronic | Status: ACTIVE | Noted: 2021-08-05

## 2021-08-23 PROBLEM — T14.8XXA OTHER INJURY OF UNSPECIFIED BODY REGION, INITIAL ENCOUNTER: Chronic | Status: ACTIVE | Noted: 2019-12-10

## 2021-08-23 PROBLEM — G47.30 SLEEP APNEA, UNSPECIFIED: Chronic | Status: ACTIVE | Noted: 2021-08-05

## 2021-08-23 LAB
ALBUMIN SERPL ELPH-MCNC: 4.3 G/DL — SIGNIFICANT CHANGE UP (ref 3.3–5)
ALP SERPL-CCNC: 105 U/L — SIGNIFICANT CHANGE UP (ref 40–120)
ALT FLD-CCNC: 43 U/L — SIGNIFICANT CHANGE UP (ref 12–78)
ANION GAP SERPL CALC-SCNC: 9 MMOL/L — SIGNIFICANT CHANGE UP (ref 5–17)
APPEARANCE UR: CLEAR — SIGNIFICANT CHANGE UP
APTT BLD: 32.6 SEC — SIGNIFICANT CHANGE UP (ref 27.5–35.5)
AST SERPL-CCNC: 21 U/L — SIGNIFICANT CHANGE UP (ref 15–37)
BASOPHILS # BLD AUTO: 0.08 K/UL — SIGNIFICANT CHANGE UP (ref 0–0.2)
BASOPHILS NFR BLD AUTO: 0.7 % — SIGNIFICANT CHANGE UP (ref 0–2)
BILIRUB SERPL-MCNC: 0.7 MG/DL — SIGNIFICANT CHANGE UP (ref 0.2–1.2)
BILIRUB UR-MCNC: NEGATIVE — SIGNIFICANT CHANGE UP
BUN SERPL-MCNC: 10 MG/DL — SIGNIFICANT CHANGE UP (ref 7–23)
CALCIUM SERPL-MCNC: 9.4 MG/DL — SIGNIFICANT CHANGE UP (ref 8.5–10.1)
CHLORIDE SERPL-SCNC: 105 MMOL/L — SIGNIFICANT CHANGE UP (ref 96–108)
CO2 SERPL-SCNC: 24 MMOL/L — SIGNIFICANT CHANGE UP (ref 22–31)
COLOR SPEC: YELLOW — SIGNIFICANT CHANGE UP
CREAT SERPL-MCNC: 0.93 MG/DL — SIGNIFICANT CHANGE UP (ref 0.5–1.3)
DIFF PNL FLD: ABNORMAL
EOSINOPHIL # BLD AUTO: 0.16 K/UL — SIGNIFICANT CHANGE UP (ref 0–0.5)
EOSINOPHIL NFR BLD AUTO: 1.4 % — SIGNIFICANT CHANGE UP (ref 0–6)
GLUCOSE SERPL-MCNC: 72 MG/DL — SIGNIFICANT CHANGE UP (ref 70–99)
GLUCOSE UR QL: NEGATIVE MG/DL — SIGNIFICANT CHANGE UP
HCG SERPL-ACNC: <1 MIU/ML — SIGNIFICANT CHANGE UP
HCT VFR BLD CALC: 43.7 % — SIGNIFICANT CHANGE UP (ref 34.5–45)
HGB BLD-MCNC: 14.6 G/DL — SIGNIFICANT CHANGE UP (ref 11.5–15.5)
IMM GRANULOCYTES NFR BLD AUTO: 0.3 % — SIGNIFICANT CHANGE UP (ref 0–1.5)
INR BLD: 1.27 RATIO — HIGH (ref 0.88–1.16)
KETONES UR-MCNC: ABNORMAL
LEUKOCYTE ESTERASE UR-ACNC: NEGATIVE — SIGNIFICANT CHANGE UP
LYMPHOCYTES # BLD AUTO: 1.45 K/UL — SIGNIFICANT CHANGE UP (ref 1–3.3)
LYMPHOCYTES # BLD AUTO: 12.6 % — LOW (ref 13–44)
MCHC RBC-ENTMCNC: 29.9 PG — SIGNIFICANT CHANGE UP (ref 27–34)
MCHC RBC-ENTMCNC: 33.4 GM/DL — SIGNIFICANT CHANGE UP (ref 32–36)
MCV RBC AUTO: 89.5 FL — SIGNIFICANT CHANGE UP (ref 80–100)
MONOCYTES # BLD AUTO: 0.88 K/UL — SIGNIFICANT CHANGE UP (ref 0–0.9)
MONOCYTES NFR BLD AUTO: 7.6 % — SIGNIFICANT CHANGE UP (ref 2–14)
NEUTROPHILS # BLD AUTO: 8.91 K/UL — HIGH (ref 1.8–7.4)
NEUTROPHILS NFR BLD AUTO: 77.4 % — HIGH (ref 43–77)
NITRITE UR-MCNC: NEGATIVE — SIGNIFICANT CHANGE UP
PH UR: 5 — SIGNIFICANT CHANGE UP (ref 5–8)
PLATELET # BLD AUTO: 280 K/UL — SIGNIFICANT CHANGE UP (ref 150–400)
POTASSIUM SERPL-MCNC: 3.7 MMOL/L — SIGNIFICANT CHANGE UP (ref 3.5–5.3)
POTASSIUM SERPL-SCNC: 3.7 MMOL/L — SIGNIFICANT CHANGE UP (ref 3.5–5.3)
PROT SERPL-MCNC: 8.4 GM/DL — HIGH (ref 6–8.3)
PROT UR-MCNC: 30 MG/DL
PROTHROM AB SERPL-ACNC: 14.6 SEC — HIGH (ref 10.6–13.6)
RBC # BLD: 4.88 M/UL — SIGNIFICANT CHANGE UP (ref 3.8–5.2)
RBC # FLD: 12.8 % — SIGNIFICANT CHANGE UP (ref 10.3–14.5)
SODIUM SERPL-SCNC: 138 MMOL/L — SIGNIFICANT CHANGE UP (ref 135–145)
SP GR SPEC: 1.02 — SIGNIFICANT CHANGE UP (ref 1.01–1.02)
UROBILINOGEN FLD QL: 1 MG/DL
WBC # BLD: 11.52 K/UL — HIGH (ref 3.8–10.5)
WBC # FLD AUTO: 11.52 K/UL — HIGH (ref 3.8–10.5)

## 2021-08-23 PROCEDURE — 74177 CT ABD & PELVIS W/CONTRAST: CPT

## 2021-08-23 PROCEDURE — 74177 CT ABD & PELVIS W/CONTRAST: CPT | Mod: 26,MA

## 2021-08-23 PROCEDURE — 96361 HYDRATE IV INFUSION ADD-ON: CPT

## 2021-08-23 PROCEDURE — 96376 TX/PRO/DX INJ SAME DRUG ADON: CPT

## 2021-08-23 PROCEDURE — 36415 COLL VENOUS BLD VENIPUNCTURE: CPT

## 2021-08-23 PROCEDURE — 87086 URINE CULTURE/COLONY COUNT: CPT

## 2021-08-23 PROCEDURE — 81001 URINALYSIS AUTO W/SCOPE: CPT

## 2021-08-23 PROCEDURE — 85025 COMPLETE CBC W/AUTO DIFF WBC: CPT

## 2021-08-23 PROCEDURE — 86900 BLOOD TYPING SEROLOGIC ABO: CPT

## 2021-08-23 PROCEDURE — 99285 EMERGENCY DEPT VISIT HI MDM: CPT

## 2021-08-23 PROCEDURE — 85610 PROTHROMBIN TIME: CPT

## 2021-08-23 PROCEDURE — 99284 EMERGENCY DEPT VISIT MOD MDM: CPT | Mod: 25

## 2021-08-23 PROCEDURE — 80053 COMPREHEN METABOLIC PANEL: CPT

## 2021-08-23 PROCEDURE — 86850 RBC ANTIBODY SCREEN: CPT

## 2021-08-23 PROCEDURE — 85730 THROMBOPLASTIN TIME PARTIAL: CPT

## 2021-08-23 PROCEDURE — 96375 TX/PRO/DX INJ NEW DRUG ADDON: CPT

## 2021-08-23 PROCEDURE — 96374 THER/PROPH/DIAG INJ IV PUSH: CPT

## 2021-08-23 PROCEDURE — 84702 CHORIONIC GONADOTROPIN TEST: CPT

## 2021-08-23 PROCEDURE — 86901 BLOOD TYPING SEROLOGIC RH(D): CPT

## 2021-08-23 RX ORDER — MORPHINE SULFATE 50 MG/1
4 CAPSULE, EXTENDED RELEASE ORAL ONCE
Refills: 0 | Status: DISCONTINUED | OUTPATIENT
Start: 2021-08-23 | End: 2021-08-23

## 2021-08-23 RX ORDER — SODIUM CHLORIDE 9 MG/ML
2000 INJECTION INTRAMUSCULAR; INTRAVENOUS; SUBCUTANEOUS ONCE
Refills: 0 | Status: COMPLETED | OUTPATIENT
Start: 2021-08-23 | End: 2021-08-23

## 2021-08-23 RX ORDER — ONDANSETRON 8 MG/1
4 TABLET, FILM COATED ORAL ONCE
Refills: 0 | Status: COMPLETED | OUTPATIENT
Start: 2021-08-23 | End: 2021-08-23

## 2021-08-23 RX ORDER — FOLIC ACID 0.8 MG
1 TABLET ORAL ONCE
Refills: 0 | Status: COMPLETED | OUTPATIENT
Start: 2021-08-23 | End: 2021-08-23

## 2021-08-23 RX ORDER — THIAMINE MONONITRATE (VIT B1) 100 MG
100 TABLET ORAL ONCE
Refills: 0 | Status: COMPLETED | OUTPATIENT
Start: 2021-08-23 | End: 2021-08-23

## 2021-08-23 RX ORDER — DEXAMETHASONE 0.5 MG/5ML
10 ELIXIR ORAL ONCE
Refills: 0 | Status: COMPLETED | OUTPATIENT
Start: 2021-08-23 | End: 2021-08-23

## 2021-08-23 RX ORDER — DEXAMETHASONE 0.5 MG/5ML
10 ELIXIR ORAL ONCE
Refills: 0 | Status: DISCONTINUED | OUTPATIENT
Start: 2021-08-23 | End: 2021-08-23

## 2021-08-23 RX ADMIN — Medication 100 MILLIGRAM(S): at 20:13

## 2021-08-23 RX ADMIN — SODIUM CHLORIDE 2000 MILLILITER(S): 9 INJECTION INTRAMUSCULAR; INTRAVENOUS; SUBCUTANEOUS at 16:48

## 2021-08-23 RX ADMIN — Medication 1 TABLET(S): at 20:12

## 2021-08-23 RX ADMIN — MORPHINE SULFATE 4 MILLIGRAM(S): 50 CAPSULE, EXTENDED RELEASE ORAL at 17:00

## 2021-08-23 RX ADMIN — Medication 10 MILLIGRAM(S): at 20:17

## 2021-08-23 RX ADMIN — Medication 102 MILLIGRAM(S): at 20:12

## 2021-08-23 RX ADMIN — MORPHINE SULFATE 4 MILLIGRAM(S): 50 CAPSULE, EXTENDED RELEASE ORAL at 16:48

## 2021-08-23 RX ADMIN — SODIUM CHLORIDE 2000 MILLILITER(S): 9 INJECTION INTRAMUSCULAR; INTRAVENOUS; SUBCUTANEOUS at 17:50

## 2021-08-23 RX ADMIN — Medication 1 MILLIGRAM(S): at 20:12

## 2021-08-23 RX ADMIN — ONDANSETRON 4 MILLIGRAM(S): 8 TABLET, FILM COATED ORAL at 16:48

## 2021-08-23 RX ADMIN — MORPHINE SULFATE 4 MILLIGRAM(S): 50 CAPSULE, EXTENDED RELEASE ORAL at 20:12

## 2021-08-23 NOTE — ED ADULT TRIAGE NOTE - CHIEF COMPLAINT QUOTE
c/o right flank pain for past 2 day intermittently, worse today, c/o abdominal incisional pain s/p gastric sleeve on 08/13/2021 Hx; asthma, depression, went to urgent care today and sent to ER to r/o kidney infection, patient states she has been unable to stay hydrated.

## 2021-08-23 NOTE — ED STATDOCS - NSFOLLOWUPINSTRUCTIONS_ED_ALL_ED_FT
Sleeve Gastrectomy, Care After      This sheet gives you information about how to care for yourself after your procedure. Your health care provider may also give you more specific instructions. If you have problems or questions, contact your health care provider.      What can I expect after the procedure?  After the procedure, it is common to have:  •Pain in the abdomen.      •Decreased appetite.      •Clear fluid leaking through the small tube (drain) that comes from your incision site.        Follow these instructions at home:    Medicines     •Take over-the-counter and prescription medicines only as told by your health care provider.      • Do not drive for 24 hours if you were given a sedative during your procedure.      • Do not drive or use heavy machinery while taking prescription pain medicine.        Incision and drain care    •Follow instructions from your health care provider about how to take care of your incisions. Make sure you:  •Wash your hands with soap and water before and after you change your bandage (dressing). If soap and water are not available, use hand .      •Change your dressing as told by your health care provider.      •Leave stitches (sutures), skin glue, or adhesive strips in place. These skin closures may need to be in place for 2 weeks or longer. If adhesive strip edges start to loosen and curl up, you may trim the loose edges. Do not remove adhesive strips completely unless your health care provider tells you to do that.        •Keep the area around your incisions and your drain clean and dry.    •Check your incision areas every day for signs of infection. Check for:  •Redness, swelling, or pain.      •Fluid or blood.      •Warmth.      •Pus or a bad smell.        •Empty your drain every day. Follow instructions from your health care provider about recording the amount of fluid that comes from your drain. Make note of any changes in the amount or appearance of the fluid.        Activity      •Rest as told by your health care provider.      •Avoid sitting for a long time without moving. Get up to take short walks every 1–2 hours. This is important to improve blood flow and breathing. Ask for help if you feel weak or unsteady.      •Return to your normal activities as told by your health care provider. Ask your health care provider what activities are safe for you.      • Do not lift anything that is heavier than 10 lb (4.5 kg), or the limit that you are told, until your health care provider says that it is safe.      •Avoid intense physical activity for as long as told by your health care provider.      Eating and drinking   •Follow instructions from your health care provider about eating or drinking restrictions. You will be given instructions about the type, the size, and the timing of your meals.  •Keep track of any foods that cause discomfort, such as bloating or cramping.      •Eat healthy foods. Avoid foods that are high in fat or sugar.        •Stop eating when you feel full.      •Take supplements only as told by your health care provider.      •Drink enough fluid to keep your urine pale yellow.      General instructions     • Do not take baths, swim, or use a hot tub until your health care provider approves. Ask your health care provider if you may take showers. You may only be allowed to take sponge baths.      • Do not use any products that contain nicotine or tobacco, such as cigarettes, e-cigarettes, and chewing tobacco. If you need help quitting, ask your health care provider.      •Wear compression stockings as told by your health care provider. These stockings help to prevent blood clots and reduce swelling in your legs.      •Do breathing exercises as told by your health care provider.      •Keep all follow-up visits as told by your health care provider. This is important.        Contact a health care provider if you have:    •Pain that gets worse or does not get better with medicine.      •Redness, swelling, or pain around your incisions.      •Fluid or blood coming from your incisions.      •Incisions that feel warm to the touch.      •Pus or a bad smell coming from your incisions.      •A fever or chills.      •Problems with your drain.      •Green or bad-smelling fluid leaking from your drain.        Get help right away if you have:    •Trouble breathing.      •Severe pain, especially in your legs.        Summary    •After the procedure, it is common to have pain in the abdomen, decreased appetite, and clear fluid leaking from the drain in an incision site.      •Take over-the-counter and prescription medicines only as told by your health care provider.      •Follow instructions from your health care provider about how to take care of your incisions. Report any signs of infection to your health care provider.      •After surgery, get up to take short walks every 1–2 hours. This is important to improve blood flow and breathing. Follow instructions from your health care provider about eating or drinking restrictions.      •Get help right away if you have trouble breathing or severe pain, especially in your legs.      This information is not intended to replace advice given to you by your health care provider. Make sure you discuss any questions you have with your health care provider.      Ovarian Cyst       An ovarian cyst is a fluid-filled sac on an ovary. Most of these cysts go away on their own and are not cancer. Some cysts need treatment.      What are the causes?    •Ovarian hyperstimulation syndrome. Some medicines may lead to this problem.      •Polycystic ovarian syndrome (PCOS). Problems with body chemicals (hormones) can lead to this condition.      •The normal menstrual cycle.        What increases the risk?    •Being overweight or very overweight.      •Taking medicines to increase your chance of getting pregnant.      •Using some types of birth control.      •Smoking.        What are the signs or symptoms?  Many ovarian cysts do not cause symptoms. If you get symptoms, you may have:  •Pain or pressure in the area between the hip bones.      •Pain in the lower belly.      •Pain during sex.      •Swelling in the lower belly.      •Periods that are not regular.      •Pain with periods.        How is this treated?  Many ovarian cysts go away on their own without treatment. If you need treatment, it may include:  •Medicines for pain.      •Fluid taken out of the cyst.      •The cyst being taken out.      •Birth control pills or other medicines.      •Surgery to remove the ovary.        Follow these instructions at home:    •Take over-the-counter and prescription medicines only as told by your doctor.      •Ask your doctor if you should avoid driving or using machines while you are taking your medicine.      •Get exams and Pap tests as told by your doctor.      •Return to your normal activities when your doctor says that it is safe.      • Do not smoke or use any products that contain nicotine or tobacco. If you need help quitting, ask your doctor.      •Keep all follow-up visits.        Contact a doctor if:  •Your periods:  •Are late.      •Are not regular.      •Stop.      •Are painful.        •You have pain in the area between your hip bones, and the pain does not go away.      •You feel pressure on your bladder.      •You have trouble peeing.      •You feel full, or your belly hurts, swells, or bloats.      •You gain or lose weight without trying, or you are less hungry than normal.      •You feel pain and pressure in your back.      •You feel pain and pressure in the area between your hip bones.      •You think you may be pregnant.        Get help right away if:    •You have pain in your belly that is very bad or gets worse.      •You have pain in the area between your hip bones, and the pain is very bad or gets worse.      •You cannot eat or drink without vomiting.      •You get a fever or chills all of a sudden.      •Your period is a lot heavier than usual.        Summary    •An ovarian cyst is a fluid-filled sac on an ovary.      •Some cysts may cause problems and need treatment.      •Most of these cysts go away on their own.      This information is not intended to replace advice given to you by your health care provider. Make sure you discuss any questions you have with your health care provider.

## 2021-08-23 NOTE — ED STATDOCS - GASTROINTESTINAL, MLM
abdomen soft and non-distended. Bowel sounds present. +mild tenderness of incision sites, +mild epigastric tenderness, +mild tenderness on right flank on light palpation

## 2021-08-23 NOTE — ED ADULT NURSE NOTE - NSICDXPASTMEDICALHX_GEN_ALL_CORE_FT
PAST MEDICAL HISTORY:  Apnea, sleep does not use cpap    Asthma exercise induced    COVID-19 vaccine series completed Jenera vaccine given 3/20/2021 and 4/17/2021    Depression     Fracture Right ankle closed bimalleolar fracture and distal end of fibula 5/2019    H/O seasonal allergies     H/O seasonal allergies     Heart murmur     TIMA (obstructive sleep apnea)

## 2021-08-23 NOTE — ED STATDOCS - PATIENT PORTAL LINK FT
You can access the FollowMyHealth Patient Portal offered by [facility] by registering at the following website: [email or web address]. By joining [facility]’s FollowMyHealth portal, you will also be able to view your health information using other applications (apps) compatible with our system.

## 2021-08-23 NOTE — ED STATDOCS - SKIN, MLM
skin normal color for race, warm, dry and intact. +surgical incision appears to be healing well, no wound dehiscence, no erythema, no discharge

## 2021-08-23 NOTE — ED STATDOCS - CARE PROVIDER_API CALL
Stanley Aguiar)  Surgery  [address], [address]  [place] [address]  Phone: [phone]  Fax: [phone]  Follow Up Time:

## 2021-08-23 NOTE — CONSULT NOTE ADULT - ASSESSMENT
30 yo F s/p sleeve gastrectomy presented wit abdominal pain.    Plan:  CT scan is negative for any surgical intervention needed  Patient is clinically and surgically stable to be discharged home .  No surgical intervention needed  Pain control as needed  Follow up with Dr. Aguiar in his office within a week       Plan was discussed with Dr. Aguiar

## 2021-08-23 NOTE — ED STATDOCS - PROGRESS NOTE DETAILS
32 y/o F with PMH of recent gastric sleeve 2 weeks ago, asthma presents with poor PO intake and abdominal pain. Pt states she feels she is dehydrated. Denies fever, chills. PE: Well appearing. Cardiac: s1s2, RRR> Lungs: CTAB. Abdomen: Surgical sites appear clean, dry, intact. NBS x4, soft, Mild epigastric tenderness. A/P: Concern for dehydration, surgical complication. Plan for labs, CTAP, analgesia, decadron, IVF, Consult with bariatric surgery. - Francisco Delgado PA-C Labs and CT reviewed with patient. Dr. Moya evaluating patient at this time as well. - Francisco Delgado PA-C Patient tolerated PO contrast. Cleared for discharge by Dr. Moya following vitamin supplements. Pt with ruptured ovarian cyst, will dc with OBGYN follow up. - Francisco Delgado PA-C

## 2021-08-23 NOTE — CONSULT NOTE ADULT - SUBJECTIVE AND OBJECTIVE BOX
30 y/o female with PMHx of asthma, depression TIMA, recent sleeve gastrectomy and hiatal hernia repair on 08/13th with Dr. Moya p/w intermittent right flank pain for x2 days. Pt c/o decreased appetite, nausea, some abd pain at incision sites, right flank pain, decreased urine output and feeling dehydrated. Bariatric surgery was consulted for evaluation.       ICU Vital Signs Last 24 Hrs  T(C): 37.1 (23 Aug 2021 13:57), Max: 37.1 (23 Aug 2021 13:57)  T(F): 98.7 (23 Aug 2021 13:57), Max: 98.7 (23 Aug 2021 13:57)  HR: 84 (23 Aug 2021 13:57) (84 - 84)  BP: 107/52 (23 Aug 2021 13:57) (107/52 - 107/52)  BP(mean): 65 (23 Aug 2021 13:57) (65 - 65)  ABP: --  ABP(mean): --  RR: 16 (23 Aug 2021 13:57) (16 - 16)  SpO2: 100% (23 Aug 2021 13:57) (100% - 100%)    · CONSTITUTIONAL: well appearing and in no apparent distress.  · EYES: clear bilaterally.  Pupils equal, round, and reactive to light.  · ENMT: Nasal mucosa clear.  Mouth with normal mucosa  Throat has no vesicles, no oropharyngeal exudates and uvula is midline.  · CARDIAC: normal rate, regular rhythm, and no murmur.  · RESPIRATORY: breath sounds clear and equal bilaterally.  · GASTROINTESTINAL: abdomen soft and non-distended. Bowel sounds present. +mild tenderness of incision sites, +mild epigastric tenderness, +mild tenderness on right flank on light palpation  · MUSCULOSKELETAL: range of motion is not limited and there is no muscle tenderness.  · NEUROLOGICAL: sensation is normal and strength is normal.  · SKIN: skin normal color for race, warm, dry and intact. +surgical incision appears to be healing well, no wound dehiscence, no erythema, no discharge                            14.6   11.52 )-----------( 280      ( 23 Aug 2021 16:35 )             43.7   08-23    138  |  105  |  10  ----------------------------<  72  3.7   |  24  |  0.93    Ca    9.4      23 Aug 2021 16:35    TPro  8.4<H>  /  Alb  4.3  /  TBili  0.7  /  DBili  x   /  AST  21  /  ALT  43  /  AlkPhos  105  08-23    PT/INR - ( 23 Aug 2021 16:35 )   PT: 14.6 sec;   INR: 1.27 ratio         PTT - ( 23 Aug 2021 16:35 )  PTT:32.6 sec    Imaging:     EXAM: CT ABDOMEN AND PELVIS OC IC      PROCEDURE DATE: 08/23/2021        INTERPRETATION: CLINICAL INFORMATION: Status post sleeve gastrectomy with abdominal pain.    COMPARISON: CT 6/5/2012.    CONTRAST/COMPLICATIONS:  IV Contrast: Omnipaque 350 90 cc administered 10 cc discarded  Oral Contrast: Omnipaque 300 + Fruit 2o  Complications: None reported at time of study completion    PROCEDURE:  CT of the Abdomen and Pelvis was performed.  Sagittal and coronal reformats were performed.    FINDINGS:  LOWER CHEST: Within normal limits.    LIVER: Within normal limits.  BILE DUCTS: Normal caliber.  GALLBLADDER: Within normal limits.  SPLEEN: Within normal limits.  PANCREAS: Within normal limits.  ADRENALS: Within normal limits.  KIDNEYS/URETERS: Within normal limits.    BLADDER: Within normal limits.  REPRODUCTIVE ORGANS: Small, 2.4 cm left ovarian cyst. Uterus and right adnexa are within normal limits.    BOWEL: Status post sleeve gastrectomy. Small hiatal hernia. No bowel obstruction. Appendix is normal.  PERITONEUM: Trace pelvic free fluid, in physiologic range. No pneumoperitoneum.  VESSELS: Within normal limits.  RETROPERITONEUM/LYMPH NODES: No lymphadenopathy.  ABDOMINAL WALL: Within normal limits.  BONES: Within normal limits.    IMPRESSION:  Small left ovarian cyst with trace pelvic free fluid, which could be physiologic versus due to cyst rupture. Otherwise, no acute findings to explain the patient's pain.

## 2021-08-23 NOTE — ED STATDOCS - CLINICAL SUMMARY MEDICAL DECISION MAKING FREE TEXT BOX
Dehydration, ovarian cyst on imaging.  Cleared from bariatric perspective.  Tolerated PO.  D/c home with f/u gynecology, and Dr. Moya.

## 2021-08-23 NOTE — ED STATDOCS - OBJECTIVE STATEMENT
32 y/o female with PMHx of asthma, depression TIMA, recent sleeve gastrectomy and hiatal hernia repair on 08/13th with Dr. Moya p/w intermittent right flank pain for x2 days. Pt c/o decreased appetite, nausea, some abd pain at incision sites, right flank pain, decreased urine output and feeling dehydrated. Has not called Dr. Moya yet to let him know of her sx, went to urgent care and was referred to ED for further eval.

## 2021-08-23 NOTE — ED STATDOCS - NSICDXPASTMEDICALHX_GEN_ALL_CORE_FT
PAST MEDICAL HISTORY:  Apnea, sleep does not use cpap    Asthma exercise induced    COVID-19 vaccine series completed Raven vaccine given 3/20/2021 and 4/17/2021    Depression     Fracture Right ankle closed bimalleolar fracture and distal end of fibula 5/2019    H/O seasonal allergies     H/O seasonal allergies     Heart murmur     TIMA (obstructive sleep apnea)

## 2021-08-24 LAB
CULTURE RESULTS: SIGNIFICANT CHANGE UP
SPECIMEN SOURCE: SIGNIFICANT CHANGE UP

## 2021-08-30 ENCOUNTER — APPOINTMENT (OUTPATIENT)
Dept: ORTHOPEDIC SURGERY | Facility: CLINIC | Age: 31
End: 2021-08-30

## 2021-09-13 ENCOUNTER — APPOINTMENT (OUTPATIENT)
Dept: OBGYN | Facility: CLINIC | Age: 31
End: 2021-09-13
Payer: COMMERCIAL

## 2021-09-13 DIAGNOSIS — D21.9 BENIGN NEOPLASM OF CONNECTIVE AND OTHER SOFT TISSUE, UNSPECIFIED: ICD-10-CM

## 2021-09-13 DIAGNOSIS — R10.2 PELVIC AND PERINEAL PAIN: ICD-10-CM

## 2021-09-13 PROCEDURE — 93976 VASCULAR STUDY: CPT

## 2021-09-13 PROCEDURE — 99204 OFFICE O/P NEW MOD 45 MIN: CPT | Mod: 25

## 2021-09-13 PROCEDURE — 76830 TRANSVAGINAL US NON-OB: CPT

## 2021-10-21 ENCOUNTER — NON-APPOINTMENT (OUTPATIENT)
Age: 31
End: 2021-10-21

## 2021-10-26 ENCOUNTER — APPOINTMENT (OUTPATIENT)
Dept: OBGYN | Facility: CLINIC | Age: 31
End: 2021-10-26

## 2021-10-27 PROBLEM — D21.9 FIBROID: Status: ACTIVE | Noted: 2021-10-27

## 2021-10-27 PROBLEM — R10.2 FEMALE PELVIC PAIN: Status: ACTIVE | Noted: 2021-10-27

## 2021-10-27 NOTE — PROCEDURE
[Suspected Ovarian Cyst] : suspected ovarian cyst [Transvaginal Ultrasound] : transvaginal ultrasound [Color Doppler Imaging] : color doppler imaging [Anteverted] : anteverted [L: ___ cm] : L: [unfilled] cm [W: ___cm] : W: [unfilled] cm [H: ___ cm] : H: [unfilled] cm [FreeTextEntry3] : SMALL MYOMA SEEN IN THE ANTERIOR FUNDAL MYOMETRIUM. NO FREE FLUID NOTED [FreeTextEntry7] : 2.70 X 1.65 [FreeTextEntry8] : 3.04 X 1.60 [FreeTextEntry4] : NO OVARIAN CYST SEEN. FOLLOWUP AS CLINICALLY INDICATED. NORMAL COLOR FLOW TO UTERUS AND OVARIES

## 2021-10-27 NOTE — CHIEF COMPLAINT
[Urgent Visit] : Urgent Visit [FreeTextEntry1] : PT SEEN IN THE OFFICE AFTER ER VISIT FOR THE QUESTION OF A RUPTURED OVARIAN CYST

## 2021-12-15 NOTE — ED ADULT NURSE NOTE - NSFALLRSKOUTCOME_ED_ALL_ED
Hematology/Medical Oncology Ambulatory Note      Patient Information     Patient Name:         Cortez Cortez   MRN:                       [id number]  YOB: 1956  65 year old   Encounter Date:      12/15/21        Patient Care Team:  Patient Care Team:  Rodney Talley MD as PCP - General (Family Practice)  Wes Maddox DO as Physician (Internal Medicine Hematology & Oncology)     Cancer Staging:    Cancer Staging  Adenocarcinoma of colon (CMS/HCC)  Staging form: Colon and Rectum, AJCC 8th Edition  - Pathologic: Stage IIIB (pT3, pN1, cM0) - Signed by Wes Maddox DO on 3/24/2021       Interval History:  Patient presents in follow-up for stage III colon cancer.  He is here for his 6-month follow-up after completing chemotherapy.  He overall feels well.  He continues to have neuropathy of his lower feet.  It has been consistent.  CAT scan showed no evidence of cancer.  He has developed abdominal hernias.  He denies any pain.       Oncology History:  Oncology History   Adenocarcinoma of colon (CMS/HCC)   2/23/2021 Initial Diagnosis    Adenocarcinoma of colon (CMS/HCC)     3/24/2021 -  Cancer Staged    Staging form: Colon and Rectum, AJCC 8th Edition  - Pathologic: Stage IIIB (pT3, pN1, cM0) - Signed by Wes Maddox DO on 3/24/2021       4/21/2021 -  Chemotherapy    fluorouracil (ADRUCIL) injection 975 mg, 400 mg/m2 = 975 mg, Intravenous, ONCE, 6 of 12 cycles  Administration: 975 mg (4/21/2021), 1,000 mg (5/5/2021), 1,000 mg (5/19/2021), 1,000 mg (6/2/2021), 1,000 mg (6/16/2021), 1,000 mg (6/30/2021)  fluorouracil (ADRUCIL) 5,905 mg in sodium chloride 0.9 % 241 mL total volume elastomeric chemo infusion, 1,200 mg/m2/day = 5,905 mg (100 % of original dose 1,200 mg/m2/day), Intravenous (Continuous Infusion), ONCE (CONTINUOUS OVER 46 HOURS), 6 of 12 cycles  Dose modification: 1,200 mg/m2/day (original dose 1,200 mg/m2/day, Cycle 1)  Administration: 5,905 mg (4/21/2021), 5,905 mg (5/5/2021), 5,905 mg  (2021), 5,905 mg (2021), 5,905 mg (2021), 5,905 mg (2021)          Past Medical, Surgical, Family and Social History  Past Medical History:   Diagnosis Date   • Anxiety    • Arthritis    • Chest pain     longtime ago, w/u by cardiologist, no further issues   • Eczema    • High cholesterol    • Malignant neoplasm (CMS/HCC) 2021    colon-stg 3 low risk   • Numbness and tingling of left leg     intermittent, ant lower leg, seems to be resolving   • Pertussis     approximately    • Pneumonia    • Seasonal allergies    • Sleep apnea     no device used   • Thrombocytosis (CMS/HCC)     per pt \"with bloodwork done at pcp's 3/3/21\"        Past Surgical History:   Procedure Laterality Date   • Colectomy  2021    Exploratory laparotomy, descending colectomy, primary anastomosis, application of Prevena wound vac -- Dr. Chris Buchanan         Family History   Problem Relation Age of Onset   • Cancer Mother    • Cancer, Lung Father         Social History     Socioeconomic History   • Marital status: /Civil Union     Spouse name: Not on file   • Number of children: Not on file   • Years of education: Not on file   • Highest education level: Not on file   Occupational History   • Occupation: retired   Tobacco Use   • Smoking status: Former Smoker     Quit date: 3/29/1975     Years since quittin.7   • Smokeless tobacco: Never Used   • Tobacco comment: quit   Substance and Sexual Activity   • Alcohol use: Yes     Comment: 1-2 drinks daily   • Drug use: Not Currently     Comment: not since high school   • Sexual activity: Not on file   Other Topics Concern   • Not on file   Social History Narrative   • Not on file     Social Determinants of Health     Financial Resource Strain:    • Social Determinants: Financial Resource Strain: Not on file   Food Insecurity:    • Social Determinants: Food Insecurity: Not on file   Transportation Needs:    • Lack of Transportation (Medical): Not on  file   • Lack of Transportation (Non-Medical): Not on file   Physical Activity:    • Days of Exercise per Week: Not on file   • Minutes of Exercise per Session: Not on file   Stress:    • Social Determinants: Stress: Not on file   Social Connections:    • Social Determinants: Social Connections: Not on file   Intimate Partner Violence: Not At Risk   • Social Determinants: Intimate Partner Violence Past Fear: No   • Social Determinants: Intimate Partner Violence Current Fear: No        Allergies and Adverse Drug Reactions  ALLERGIES:   Allergen Reactions   • Grass Other (See Comments)     hayfever s&s   • Methocarbamol Other (See Comments)   • Mold   (Environmental) Other (See Comments)     hayfever s&s   • Oxycodone RASH   • Statins MYALGIA   • Trees Other (See Comments)     hayfever s&s        Medications  Current Outpatient Medications   Medication Sig Dispense Refill   • ondansetron (ZOFRAN) 8 MG tablet Take 1 tablet by mouth 2 times daily. For two days post-chemotherapy as needed for nausea. 8 tablet 5   • NON FORMULARY OTC cream for eczema prn     • Ascorbic Acid (VITAMIN C PO)      • Plant Sterols and Stanols 450 MG Tab Take by mouth daily. Cholest-Off     • Multiple Vitamin (MULTIVITAMIN ADULT PO) Take by mouth daily.     • Omega-3 Fatty Acids (Fish Oil) 1000 MG capsule Take 2 g by mouth daily.     • acetaminophen (TYLENOL) 500 MG tablet Take 2 tablets by mouth every 8 hours. Take around the clock x 3 days then as needed for mild pain. 30 tablet 0   • loratadine (CLARITIN) 10 MG tablet Take 10 mg by mouth daily.       No current facility-administered medications for this visit.     Facility-Administered Medications Ordered in Other Visits   Medication Dose Route Frequency Provider Last Rate Last Admin   • heparin 100 UNIT/ML lock flush 500 Units  5 mL Intracatheter PRN Wes Maddox DO   500 Units at 12/15/21 1019        Subjective     Review of Systems:  Review of Systems   Constitutional: Positive for  fatigue. Negative for activity change, appetite change, chills and fever.   HENT: Negative for ear pain, mouth sores, sore throat, tinnitus, trouble swallowing and voice change.    Eyes: Negative for pain, redness and visual disturbance.   Respiratory: Negative for cough, chest tightness, shortness of breath, wheezing and stridor.    Cardiovascular: Negative for chest pain, palpitations and leg swelling.   Gastrointestinal: Negative for abdominal distention, abdominal pain, constipation, diarrhea, nausea and vomiting.   Genitourinary: Negative for dysuria, frequency, hematuria and urgency.   Musculoskeletal: Negative for arthralgias, joint swelling and myalgias.   Skin: Negative for rash.   Neurological: Positive for numbness. Negative for weakness and headaches.   Hematological: Negative for adenopathy. Does not bruise/bleed easily.           Objective     Visit Vitals  /76 (BP Location: LUE - Left upper extremity, Patient Position: Sitting, Cuff Size: Large Adult)   Pulse 73   Temp 98 °F (36.7 °C) (Oral)   Resp 20   Ht 5' 10\" (1.778 m)   Wt (!) 144.1 kg (317 lb 11.2 oz)   SpO2 95%   BMI 45.59 kg/m²       Physical Exam  Constitutional:       Appearance: Normal appearance. He is obese.   HENT:      Head: Normocephalic and atraumatic.      Nose: Nose normal.      Mouth/Throat:      Mouth: Mucous membranes are moist.   Eyes:      Extraocular Movements: Extraocular movements intact.      Conjunctiva/sclera: Conjunctivae normal.      Pupils: Pupils are equal, round, and reactive to light.   Cardiovascular:      Rate and Rhythm: Normal rate and regular rhythm.      Heart sounds: Normal heart sounds.   Pulmonary:      Effort: Pulmonary effort is normal.      Breath sounds: Normal breath sounds.   Abdominal:      General: Bowel sounds are normal.      Palpations: Abdomen is soft.      Hernia: A hernia is present.   Musculoskeletal:      Cervical back: Neck supple.   Skin:     General: Skin is warm and dry.    Neurological:      Mental Status: He is alert and oriented to person, place, and time.   Psychiatric:         Mood and Affect: Mood normal.          PERFORMANCE STATUS:  ECOG 0      CBC:  Lab Results   Component Value Date/Time    WBC 6.7 12/15/2021 10:17 AM    RBC 4.80 12/15/2021 10:17 AM    HGB 14.4 12/15/2021 10:17 AM    HCT 45.1 12/15/2021 10:17 AM    MCV 94.0 12/15/2021 10:17 AM    MCH 30.0 12/15/2021 10:17 AM    MCHC 31.9 (L) 12/15/2021 10:17 AM    RDWCV 13.0 12/15/2021 10:17 AM    RDWSD 44.7 12/15/2021 10:17 AM     12/15/2021 10:17 AM    NRBCRE 0 12/15/2021 10:17 AM    SEG 60 12/15/2021 10:17 AM    TLYMPH 29 12/15/2021 10:17 AM    PMON 8 12/15/2021 10:17 AM    PEOS 2 12/15/2021 10:17 AM    PBASO 1 12/15/2021 10:17 AM    IGRE 0 12/15/2021 10:17 AM    ANEUT 4.0 12/15/2021 10:17 AM    ALYMS 2.0 12/15/2021 10:17 AM    JUANJOSE 0.5 12/15/2021 10:17 AM    AEOS 0.2 12/15/2021 10:17 AM    ABASO 0.1 12/15/2021 10:17 AM    IGAB 0.0 12/15/2021 10:17 AM         CMP:  Lab Results   Component Value Date/Time    SODIUM 136 12/15/2021 10:17 AM    POTASSIUM 3.9 12/15/2021 10:17 AM    CHLORIDE 105 12/15/2021 10:17 AM    CO2 26 12/15/2021 10:17 AM    ANIONGAP 9 (L) 12/15/2021 10:17 AM    GLUCOSE 112 (H) 12/15/2021 10:17 AM    BUN 15 12/15/2021 10:17 AM    CREATININE 0.65 (L) 12/15/2021 10:17 AM    GFRESTIMATE >90 12/15/2021 10:17 AM    BCRAT 23 12/15/2021 10:17 AM    CALCIUM 9.2 12/15/2021 10:17 AM    BILIRUBIN 0.7 12/15/2021 10:17 AM    AST 26 12/15/2021 10:17 AM    GPT 35 12/15/2021 10:17 AM    ALKPT 61 12/15/2021 10:17 AM    ALBUMIN 3.6 12/15/2021 10:17 AM    TOTPROTEIN 7.2 12/15/2021 10:17 AM    GLOB 3.6 12/15/2021 10:17 AM    AGR 1.0 12/15/2021 10:17 AM           TREATMENT PLAN:  FOLFOX x6 cycles, he completed this in June 2021      PATHOLOGY:  Moderately differentiated invasive adenocarcinoma of the descending colon.  Metastatic carcinoma in 3 out of 16 lymph nodes. The tumor invades through the muscular wall to  pericolonic adipose tissue.  All margins are negative for malignancy.  There is extracapsular extension.  His greatest dimension of tumor was 4.1 cm.  Small vessel lymphovascular invasion was identified. Perineural invasion was not identified.  Microsatellite instability was stable. K-epifanio was detected.      ASSESSMENT:  Stage IIIB (T3 N1 MX) colorectal adenocarcinoma of the descending colon.  He is status post adjuvant FOLFOX which he completed in June 2021.  6-month follow-up CAT scan showed no evidence of disease.    Neuropathy secondary to chemotherapy.  Confined to his bilateral feet.  He is now interested in trying treatment to help alleviate some of the symptoms.  I recommended amitriptyline 25 mg at bedtime.      Abdominal hernia secondary to surgery.  He denies any symptoms.  I recommended that he follow-up with his surgeon to discuss this in further detail.      PLAN:  Await CEA  Next CAT scan will be due in June 2022  Follow-up with general surgery to discuss port removal and abdominal hernia  Follow-up with GI for colonoscopy due in February 2022  Return to clinic in 3 months      Total time: 15 minutes with greater than 50% of the time counseling and educating the patient       Universal Safety Interventions

## 2022-01-26 ENCOUNTER — APPOINTMENT (OUTPATIENT)
Dept: INTERNAL MEDICINE | Facility: CLINIC | Age: 32
End: 2022-01-26

## 2022-03-02 NOTE — H&P PST ADULT - TEMPERATURE IN FAHRENHEIT (DEGREES F)
Quality 110: Preventive Care And Screening: Influenza Immunization: Influenza Immunization Administered during Influenza season Detail Level: Detailed Quality 226: Preventive Care And Screening: Tobacco Use: Screening And Cessation Intervention: Patient screened for tobacco use and is an ex/non-smoker 98

## 2022-07-22 NOTE — ED PROVIDER NOTE - CADM POA PRESS ULCER
Please advise.   Last seen 06/30/2022, upcoming 08/11/2022  ondansetron (Zofran ODT) 4 MG disintegrating tablet 15 tablet 0 6/30/2022     Sig - Route: Place 1 tablet onto the tongue every 8 hours as needed for Nausea. - Translingual    Sent to pharmacy as: Ondansetron 4 MG Oral Tablet Disintegrating    Class: Eprescribe         No

## 2022-08-04 ENCOUNTER — NON-APPOINTMENT (OUTPATIENT)
Age: 32
End: 2022-08-04

## 2022-12-09 ENCOUNTER — NON-APPOINTMENT (OUTPATIENT)
Age: 32
End: 2022-12-09

## 2023-03-17 NOTE — ED PROVIDER NOTE - CPE EDP RESP NORM
Patient is without complaints.  She denies any HA,VD or RUQ pain.    O) /70  Lungs:CTA dinora  Heart:RRR  Abdomen:Soft NT/Gravid.    A) IUP at 33.5 weeks EGA with gestational HTN/Possible preeclampsia.  She received full course of steroids. She is continuing 24 hour urine collection.  P) Continue current management.       Plan on discharge home after having 24 hour urine protein results.   normal...

## 2023-07-11 NOTE — ASU PATIENT PROFILE, ADULT - NSCAFFEINETYPE_GEN_ALL_CORE_SD
Anesthesia at bedside to place epidural. Preprocedure timeout complete. Patient off efm at this time. pop/soda

## 2024-07-15 NOTE — ED ADULT NURSE NOTE - CHPI ED NUR SYMPTOMS POS
Advance Care Planning   [facility] - Cardiac Medical ICU  Palliative Care   Psychosocial Assessment    Patient Name: Juhi Taylor  MRN: [id number]  Admission Date: 7/3/2024  Hospital Length of Stay: 12 days  Code Status: Full Code   Attending Provider: Kristin Alvarado MD  Palliative Care Provider:   Primary Care Physician: Tony Sadler MD  Principal Problem:ESRD (end stage renal disease)    Reason for Referral: assistance with clarification of goals of care and psychosocial support  Consult Order Date:   Primary CM/SW:    Present during Interview: patient, relative(s), past medical records, ER records, and primary team.      Primary Language:English   Needed: no      Past Medical Situation:   PMH:   Past Medical History:   Diagnosis Date    Anticoagulant long-term use     Arthritis     Breast cancer 2014    invasive lobular carcinoma    Cancer of kidney 11/2020    RIGHT KIDNEY CANCER    CHF (congestive heart failure)     Coronary artery disease dx 2005    Depression     Diabetes mellitus     Diastolic heart failure secondary to hypertension     Gout     Hyperlipemia     Hypertension     Hypertrophy of nasal turbinates     Kidney mass 2020    Right    Levoscoliosis     Lung nodule     left    Multiple thyroid nodules     NICOLE (obstructive sleep apnea)     uses C-PAP    Pulmonary hypertension     Severe sepsis 07/01/2024     Mental Health/Substance Use History: none disclosed   Risk of Abuse, neglect or exploitation: none disclosed   Current or Previous Trauma and/or evidence of PTSD: none disclosed   Non-traditional Health practices: none disclosed     Understanding of diagnosis and prognosis: moderate   Experience/Comfort level with health care system: pt has been hospitalized for approx. 3 weeks.    Patients Mental Status: intubated and sedated     Socio-Economic Factors/Resources:  Address: [place] [address]  Phone Number: [phone] (home)     Marital Status:    Household composition: pt lives with dtr Nell  Children: 4. 3 living: Maria Luisa, Nell, and Dawood.    Patient/Family perceptions about Caregiving Needs; availability and capacity: pt/family are aware of pt's current debility and increased caregiving needs.     Family Structure, Dynamics/Relationships: pt has good relationship with supportive family.     Patterns/Styles of Communication and Decision-making in the Family: pt has been in and out of lucidity for the duration of her hospital stay. Pts children are making medical decisions together when she is unable.     Patient/Family Strengths/Resilience: pt is strong, resilient, and caring  Patient/Family Coping: appropriate with anticipatory grief    Activities of Daily Living: pt was independent with ADLs prior to hospitalization.   Support Systems-Family & Community (Home Health, HME etc): TBD     Transportation:  yes    Work/Education History: retired  Self-Care Activities/Hobbies: spending time with family, prayer     History: no    Financial Resources:Medicare      Advanced Care Planning & Legal Concerns:   Advanced Directives/Living Will: no  LaPOST/POLST: no   Planning:  no    Medical Power of : no     Oral/Written Declaration: no  Witnesses:   Surrogate Decision Maker:     Emergency Contacts:    Spirituality, Culture & Coping Mechanisms:  F- Fely and Belief: Non-Restorationist     I - Importance:     C - Community/Culture Values:     A - Address in Care:       Goals/Hopes/Expectations: to go home  Fears/Anxiety/Concerns: not wanting to live of machines        Preferences about EOL Environment: (own bed, family nearby, pets, music, etc): home      Complicated Bereavement Risk Assessment Tool (CBRAT)  Reference:  [facility] Group (May 2016). Bereavement Risk Screening and Management Guidelines.  Retrieved from:  http://www[facility].com.au/wp-content/uploads//KNJAD-Xiacokbdaqm-Thvprsmdj-and-Management-Guideline--.pdf      Bereaved Client Characteristics   Under 18      no  Was a Twin   no  Young Spouse   no  Elderly Spouse    no  Isolated    no  Lacks Meaningful Social Support   no  Dissatisfied with help available during illness   no  New to Financial [place] no  New to Decision-Making   no    Illness  Inherited Disorder   no  Stigmatized Disease in the family/community   no  Lengthy/Burdensome   yes     Bereaved Client's History of Loss   Cumulative Multiple Losses   no  Previous Mental Health Illnesses   no  Current Mental Health Illness   no  Other Significant Health Issues   no   Migrant/Refugee   no Will the Death be:  Sudden or Unexpected   no  Traumatic Circumstances Associated with Death   no  Significant Cultural/Social Burdens as a result of Death   no   Relationship with   Profound Lifelong Partner   no  Highly Dependent    no  Antagonistic   no  Ambivalent   no  Deeply Connected   yes  Culturally Defined   yes   Risk Factors Scores  0-2  Low  3-5  Moderate  5+  High  All persons scoring moderate to high presume to be at risk**    (** It is acknowledged that protective factors and resilience may outweigh apparent risk factors.      Total Risk Factors Score:   moderate, with protective factors.     Advance Care Planning     Date: 07/15/2024    [facility]  I engaged the family in a voluntary conversation about advance care planning and we specifically addressed what the goals of care would be moving forward, in light of the patient's change in clinical status, specifically end stage renal disease, congestive heart failure.  We did specifically address the patient's likely prognosis, which is  guarded .  We explored the patient's values and preferences for future care.  The family endorses that what is most important right now is to focus on  life prolongation with full supportive  "care.    Accordingly, we have decided that the best plan to meet the patient's goals includes continuing with treatment    A total of 70 min was spent on advance care planning, goals of care discussion, emotional support, formulating and communicating prognosis and exploring burden/benefit of various approaches of treatment. This discussion occurred on a fully voluntary basis with the verbal consent of the patient and/or family.           Discharge Planning Needs/Plan of Care:     YUSRA, along with TAI ARZATE, met with pt's dtr Maria Luisa at bedside. Pt experienced aspiration event in the OR this morning and had to be intubated, thus, unable to participate in conversation. Per dtr, pt was AAOx3 and in good spirits prior to leaving for the OR. Pt has been waffling back and forth between wanting to stop treatment and go home, and to continue "doing what the doctors say to do." It has been said that pt has been far more direct with her dtr Maria Luisa, who relays that pt has told her on several occasions that she is ready to stop and go to [facility]. However, she seemingly has been shielding and appeasing her other children and other family members, and agreeing to continue aggressive treatment. Maria Luisa understands that pt has a multitude of serious health issues and further complications are possible moving forward, despite maximum supportive care. Maria Luisa expresses that she hates to see pt suffer, but is holding onto hope that pt will be able to get the ET tube out and both verbally relay, as well as write her wishes down, so that the whole family can be aware and carry them out moving forward, without any assumptions. Maria Luisa is agreeable to having ongoing conversations with pal med in efforts to determine best next steps in pt's care that honor pt's wishes and align with a quality of life that she would find acceptable. YUSRA [facility].     Tiff Guan, YUSRA-VITOS, [facility]-  Department of Palliative Medicine                 " LACERATION

## 2024-07-22 ENCOUNTER — APPOINTMENT (OUTPATIENT)
Dept: INTERNAL MEDICINE | Facility: CLINIC | Age: 34
End: 2024-07-22
Payer: COMMERCIAL

## 2024-07-22 ENCOUNTER — NON-APPOINTMENT (OUTPATIENT)
Age: 34
End: 2024-07-22

## 2024-07-22 VITALS
DIASTOLIC BLOOD PRESSURE: 63 MMHG | HEIGHT: 65 IN | OXYGEN SATURATION: 99 % | BODY MASS INDEX: 22.49 KG/M2 | WEIGHT: 135 LBS | SYSTOLIC BLOOD PRESSURE: 108 MMHG | HEART RATE: 83 BPM

## 2024-07-22 DIAGNOSIS — R01.1 CARDIAC MURMUR, UNSPECIFIED: ICD-10-CM

## 2024-07-22 DIAGNOSIS — Z83.3 FAMILY HISTORY OF DIABETES MELLITUS: ICD-10-CM

## 2024-07-22 DIAGNOSIS — R55 SYNCOPE AND COLLAPSE: ICD-10-CM

## 2024-07-22 DIAGNOSIS — Z82.49 FAMILY HISTORY OF ISCHEMIC HEART DISEASE AND OTHER DISEASES OF THE CIRCULATORY SYSTEM: ICD-10-CM

## 2024-07-22 DIAGNOSIS — Z83.511 FAMILY HISTORY OF GLAUCOMA: ICD-10-CM

## 2024-07-22 DIAGNOSIS — Z00.00 ENCOUNTER FOR GENERAL ADULT MEDICAL EXAMINATION W/OUT ABNORMAL FINDINGS: ICD-10-CM

## 2024-07-22 DIAGNOSIS — F17.200 NICOTINE DEPENDENCE, UNSPECIFIED, UNCOMPLICATED: ICD-10-CM

## 2024-07-22 DIAGNOSIS — Z12.83 ENCOUNTER FOR SCREENING FOR MALIGNANT NEOPLASM OF SKIN: ICD-10-CM

## 2024-07-22 DIAGNOSIS — Z81.4 FAMILY HISTORY OF OTHER SUBSTANCE ABUSE AND DEPENDENCE: ICD-10-CM

## 2024-07-22 DIAGNOSIS — Z82.5 FAMILY HISTORY OF ASTHMA AND OTHER CHRONIC LOWER RESPIRATORY DISEASES: ICD-10-CM

## 2024-07-22 PROCEDURE — 93000 ELECTROCARDIOGRAM COMPLETE: CPT

## 2024-07-22 PROCEDURE — 99385 PREV VISIT NEW AGE 18-39: CPT

## 2024-07-22 PROCEDURE — G2211 COMPLEX E/M VISIT ADD ON: CPT | Mod: NC

## 2024-07-22 RX ORDER — NICOTINE POLACRILEX 2 MG/1
2 GUM, CHEWING BUCCAL
Qty: 1 | Refills: 0 | Status: ACTIVE | COMMUNITY
Start: 2024-07-22 | End: 1900-01-01

## 2024-07-22 RX ORDER — PHENYLEPHRINE HCL 10 MG
7 TABLET ORAL DAILY
Qty: 1 | Refills: 0 | Status: ACTIVE | COMMUNITY
Start: 2024-07-22 | End: 1900-01-01

## 2024-07-22 NOTE — HISTORY OF PRESENT ILLNESS
[FreeTextEntry1] : NPA / CPE [de-identified] : Patient is a 34-year-old female with PMH gastric sleeve (2021), right ankle dislocation/fibula fracture, heel fracture presenting for NPA/CPE. Patient works remotely as a  and a  on the weekends. She has lost 130 lbs in the last 3 years. Patient uses medical marijuana for chronic ankle pain (Dr. Nuno). Patient reports 2 weekends ago, she was out with her friends and it was hot out. She had 2 drinks and she was standing against a fence and blacked out and lost consciousness. Out for 3-5 minutes. She was breathing very heavy at the time. It took her a few minutes to regain herself and she drank water and then felt better. No bowel/bladder incontinence. No tongue biting. This was the third episode of fainting. Previous time was last summer and had been standing outside and fainted. 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 She has been drinking more liquid IV since this happened to get more salt in her diet. She drinks one red bull every 2 days. She is nicotine vaping, quit cigarettes 2 months ago. Patient reports she was previously diagnosed with a murmur, had echo done that she reports was normal.  Last pap- 5 years ago (negative) Last eye exam- 2023 Last dental exam- 06/2024 Last skin cancer screening- never

## 2024-07-22 NOTE — PHYSICAL EXAM
[No Acute Distress] : no acute distress [Well Nourished] : well nourished [Well Developed] : well developed [Well-Appearing] : well-appearing [Normal Sclera/Conjunctiva] : normal sclera/conjunctiva [PERRL] : pupils equal round and reactive to light [EOMI] : extraocular movements intact [Normal Outer Ear/Nose] : the outer ears and nose were normal in appearance [Normal Oropharynx] : the oropharynx was normal [No JVD] : no jugular venous distention [No Lymphadenopathy] : no lymphadenopathy [Supple] : supple [Thyroid Normal, No Nodules] : the thyroid was normal and there were no nodules present [No Respiratory Distress] : no respiratory distress  [No Accessory Muscle Use] : no accessory muscle use [Clear to Auscultation] : lungs were clear to auscultation bilaterally [Regular Rhythm] : with a regular rhythm [Normal S1, S2] : normal S1 and S2 [No Carotid Bruits] : no carotid bruits [No Abdominal Bruit] : a ~M bruit was not heard ~T in the abdomen [No Varicosities] : no varicosities [Pedal Pulses Present] : the pedal pulses are present [No Edema] : there was no peripheral edema [No Palpable Aorta] : no palpable aorta [No Extremity Clubbing/Cyanosis] : no extremity clubbing/cyanosis [Soft] : abdomen soft [Non Tender] : non-tender [Non-distended] : non-distended [No Masses] : no abdominal mass palpated [No HSM] : no HSM [Normal Bowel Sounds] : normal bowel sounds [Normal Posterior Cervical Nodes] : no posterior cervical lymphadenopathy [Normal Anterior Cervical Nodes] : no anterior cervical lymphadenopathy [No CVA Tenderness] : no CVA  tenderness [No Spinal Tenderness] : no spinal tenderness [No Joint Swelling] : no joint swelling [Grossly Normal Strength/Tone] : grossly normal strength/tone [No Rash] : no rash [Coordination Grossly Intact] : coordination grossly intact [No Focal Deficits] : no focal deficits [Normal Gait] : normal gait [Deep Tendon Reflexes (DTR)] : deep tendon reflexes were 2+ and symmetric [Normal Affect] : the affect was normal [Normal Insight/Judgement] : insight and judgment were intact [de-identified] : bradycardia, systolic murmur

## 2024-07-22 NOTE — HISTORY OF PRESENT ILLNESS
[FreeTextEntry1] : NPA / CPE [de-identified] : Patient is a 34-year-old female with PMH gastric sleeve (2021), right ankle dislocation/fibula fracture, heel fracture presenting for NPA/CPE. Patient works remotely as a  and a  on the weekends. She has lost 130 lbs in the last 3 years. Patient uses medical marijuana for chronic ankle pain (Dr. Nuno). Patient reports 2 weekends ago, she was out with her friends and it was hot out. She had 2 drinks and she was standing against a fence and blacked out and lost consciousness. Out for 3-5 minutes. She was breathing very heavy at the time. It took her a few minutes to regain herself and she drank water and then felt better. No bowel/bladder incontinence. No tongue biting. This was the third episode of fainting. Previous time was last summer and had been standing outside and fainted. 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 She has been drinking more liquid IV since this happened to get more salt in her diet. She drinks one red bull every 2 days. She is nicotine vaping, quit cigarettes 2 months ago. Patient reports she was previously diagnosed with a murmur, had echo done that she reports was normal.  Last pap- 5 years ago (negative) Last eye exam- 2023 Last dental exam- 06/2024 Last skin cancer screening- never

## 2024-07-22 NOTE — HEALTH RISK ASSESSMENT
[Good] : ~his/her~  mood as  good [Employed] : employed [Yes] : Yes [Monthly or less (1 pt)] : Monthly or less (1 point) [1 or 2 (0 pts)] : 1 or 2 (0 points) [Never (0 pts)] : Never (0 points) [No] : In the past 12 months have you used drugs other than those required for medical reasons? No [Never] : Never [< 15 Years] : < 15 Years [One fall no injury in past year] : Patient reported one fall in the past year without injury [0] : 2) Feeling down, depressed, or hopeless: Not at all (0) [PHQ-2 Negative - No further assessment needed] : PHQ-2 Negative - No further assessment needed [No Retinopathy] : No retinopathy [Patient reported PAP Smear was normal] : Patient reported PAP Smear was normal [HIV Test offered] : HIV Test offered [Hepatitis C test offered] : Hepatitis C test offered [With Significant Other] : lives with significant other [Significant Other] : lives with significant other [Sexually Active] : sexually active [Feels Safe at Home] : Feels safe at home [Fully functional (bathing, dressing, toileting, transferring, walking, feeding)] : Fully functional (bathing, dressing, toileting, transferring, walking, feeding) [Fully functional (using the telephone, shopping, preparing meals, housekeeping, doing laundry, using] : Fully functional and needs no help or supervision to perform IADLs (using the telephone, shopping, preparing meals, housekeeping, doing laundry, using transportation, managing medications and managing finances) [Reports normal functional visual acuity (ie: able to read med bottle)] : Reports normal functional visual acuity [Smoke Detector] : smoke detector [Carbon Monoxide Detector] : carbon monoxide detector [Safety elements used in home] : safety elements used in home [Seat Belt] :  uses seat belt [Sunscreen] : uses sunscreen [NO] : No [Audit-CScore] : 1 [de-identified] : a lot of weight lifting, plays volleyball [de-identified] : small meals [de-identified] : syncopal episode [[id number]] : 0 [EyeExamDate] : 2023 [High Risk Behavior] : no high risk behavior [Reports changes in hearing] : Reports no changes in hearing [Reports changes in vision] : Reports no changes in vision [Reports changes in dental health] : Reports no changes in dental health [Travel to Developing Areas] : does not  travel to developing areas [TB Exposure] : is not being exposed to tuberculosis [Caregiver Concerns] : does not have caregiver concerns [MammogramComments] : N/A [PapSmearDate] : 2019 [BoneDensityComments] : N/A [ColonoscopyComments] : N/A [FreeTextEntry2] :  and  [de-identified] : 0.2 PPD x 8 years = 2 pack years, quit cigarettes 2 months ago and is now nicotine vaping

## 2024-07-22 NOTE — HEALTH RISK ASSESSMENT
[Good] : ~his/her~  mood as  good [Employed] : employed [Yes] : Yes [Monthly or less (1 pt)] : Monthly or less (1 point) [1 or 2 (0 pts)] : 1 or 2 (0 points) [Never (0 pts)] : Never (0 points) [No] : In the past 12 months have you used drugs other than those required for medical reasons? No [Never] : Never [< 15 Years] : < 15 Years [One fall no injury in past year] : Patient reported one fall in the past year without injury [0] : 2) Feeling down, depressed, or hopeless: Not at all (0) [PHQ-2 Negative - No further assessment needed] : PHQ-2 Negative - No further assessment needed [No Retinopathy] : No retinopathy [Patient reported PAP Smear was normal] : Patient reported PAP Smear was normal [HIV Test offered] : HIV Test offered [Hepatitis C test offered] : Hepatitis C test offered [With Significant Other] : lives with significant other [Significant Other] : lives with significant other [Sexually Active] : sexually active [Feels Safe at Home] : Feels safe at home [Fully functional (bathing, dressing, toileting, transferring, walking, feeding)] : Fully functional (bathing, dressing, toileting, transferring, walking, feeding) [Fully functional (using the telephone, shopping, preparing meals, housekeeping, doing laundry, using] : Fully functional and needs no help or supervision to perform IADLs (using the telephone, shopping, preparing meals, housekeeping, doing laundry, using transportation, managing medications and managing finances) [Reports normal functional visual acuity (ie: able to read med bottle)] : Reports normal functional visual acuity [Smoke Detector] : smoke detector [Carbon Monoxide Detector] : carbon monoxide detector [Safety elements used in home] : safety elements used in home [Seat Belt] :  uses seat belt [Sunscreen] : uses sunscreen [NO] : No [Audit-CScore] : 1 [de-identified] : a lot of weight lifting, plays volleyball [de-identified] : small meals [de-identified] : syncopal episode [[id number]] : 0 [EyeExamDate] : 2023 [High Risk Behavior] : no high risk behavior [Reports changes in hearing] : Reports no changes in hearing [Reports changes in vision] : Reports no changes in vision [Reports changes in dental health] : Reports no changes in dental health [Travel to Developing Areas] : does not  travel to developing areas [TB Exposure] : is not being exposed to tuberculosis [Caregiver Concerns] : does not have caregiver concerns [MammogramComments] : N/A [PapSmearDate] : 2019 [BoneDensityComments] : N/A [ColonoscopyComments] : N/A [FreeTextEntry2] :  and  [de-identified] : 0.2 PPD x 8 years = 2 pack years, quit cigarettes 2 months ago and is now nicotine vaping

## 2024-07-22 NOTE — PHYSICAL EXAM
[No Acute Distress] : no acute distress [Well Nourished] : well nourished [Well Developed] : well developed [Well-Appearing] : well-appearing [Normal Sclera/Conjunctiva] : normal sclera/conjunctiva [PERRL] : pupils equal round and reactive to light [EOMI] : extraocular movements intact [Normal Outer Ear/Nose] : the outer ears and nose were normal in appearance [Normal Oropharynx] : the oropharynx was normal [No JVD] : no jugular venous distention [No Lymphadenopathy] : no lymphadenopathy [Supple] : supple [Thyroid Normal, No Nodules] : the thyroid was normal and there were no nodules present [No Respiratory Distress] : no respiratory distress  [No Accessory Muscle Use] : no accessory muscle use [Clear to Auscultation] : lungs were clear to auscultation bilaterally [Regular Rhythm] : with a regular rhythm [Normal S1, S2] : normal S1 and S2 [No Carotid Bruits] : no carotid bruits [No Abdominal Bruit] : a ~M bruit was not heard ~T in the abdomen [No Varicosities] : no varicosities [Pedal Pulses Present] : the pedal pulses are present [No Edema] : there was no peripheral edema [No Palpable Aorta] : no palpable aorta [No Extremity Clubbing/Cyanosis] : no extremity clubbing/cyanosis [Soft] : abdomen soft [Non Tender] : non-tender [Non-distended] : non-distended [No Masses] : no abdominal mass palpated [No HSM] : no HSM [Normal Bowel Sounds] : normal bowel sounds [Normal Posterior Cervical Nodes] : no posterior cervical lymphadenopathy [Normal Anterior Cervical Nodes] : no anterior cervical lymphadenopathy [No CVA Tenderness] : no CVA  tenderness [No Spinal Tenderness] : no spinal tenderness [No Joint Swelling] : no joint swelling [Grossly Normal Strength/Tone] : grossly normal strength/tone [No Rash] : no rash [Coordination Grossly Intact] : coordination grossly intact [No Focal Deficits] : no focal deficits [Normal Gait] : normal gait [Deep Tendon Reflexes (DTR)] : deep tendon reflexes were 2+ and symmetric [Normal Affect] : the affect was normal [Normal Insight/Judgement] : insight and judgment were intact [de-identified] : bradycardia, systolic murmur

## 2024-07-22 NOTE — PLAN
[FreeTextEntry1] : Syncope: - Likely due to dehydration/vasovagal syncope, as all episodes of passing out have occurred when patient was standing outside in the sun during the summer for long periods of time - EKG shows SB with rate variation and diffuse low voltage - Cardiology referral placed. Patient given number to call to make appt. Advised patient to call office if unable to get an appt soon.  - Recommend increasing hydration with goal of  fl oz per day. - BP borderline low. Recommend increasing salt intake with subsequent water intake to avoid hypotension - Recommend decreasing red bull intake - Advised patient to avoid prolonged periods of standing in the sun.  Nicotine dependence: - Patient motivated to quit nicotine vaping - Nicotine 7mg/24 hr transdermal patches ordered. Discussed side effects including local irritation and insomnia. Advised patient to remove patch at night and replace patch in the morning if having trouble sleeping.  - Nicotine Polacrilex 2 mg gum ordered. Advised patient to utilize gum for breakthrough cravings  HCM: - Patient did not fast today. Script provided to patient to have fasting blood work done at outside lab.  - Patient to make GYN appt for pap smear/establishment of care - Next eye exam- advised patient she is due for an eye exam this year - Next dental exam- 12/2024 - Dermatology referral placed for skin cancer screening

## 2024-07-30 ENCOUNTER — NON-APPOINTMENT (OUTPATIENT)
Age: 34
End: 2024-07-30

## 2024-08-08 ENCOUNTER — NON-APPOINTMENT (OUTPATIENT)
Age: 34
End: 2024-08-08

## 2024-08-08 ENCOUNTER — APPOINTMENT (OUTPATIENT)
Dept: INTERNAL MEDICINE | Facility: CLINIC | Age: 34
End: 2024-08-08

## 2024-08-08 ENCOUNTER — APPOINTMENT (OUTPATIENT)
Dept: CARDIOLOGY | Facility: CLINIC | Age: 34
End: 2024-08-08

## 2024-08-08 PROBLEM — R55 VASOVAGAL EPISODE: Status: ACTIVE | Noted: 2024-08-08

## 2024-08-08 PROBLEM — J36 PERITONSILLAR ABSCESS: Status: ACTIVE | Noted: 2024-08-08

## 2024-08-08 PROCEDURE — 93000 ELECTROCARDIOGRAM COMPLETE: CPT

## 2024-08-08 PROCEDURE — 99204 OFFICE O/P NEW MOD 45 MIN: CPT | Mod: 25

## 2024-08-08 PROCEDURE — G2211 COMPLEX E/M VISIT ADD ON: CPT | Mod: NC

## 2024-08-08 PROCEDURE — 99215 OFFICE O/P EST HI 40 MIN: CPT

## 2024-08-08 NOTE — DISCUSSION/SUMMARY
[FreeTextEntry1] : 34F h/o anxiety/depression, TIMA, obesity (s/p bariatric gastric sleeve surgery 2021 BMI 43--> 22), asthma, former smoker, chronic pain (on medical marijuana), had recent syncope while standing with alcohol drink, reportedly loss consciousness for 3-5 minutes witnessed by her friend, had prior syncope x2 also during Summer weather, seen by PCP recently, refer for cardiology evaluation.   Recurrent syncope episodes all occur during Summer and standing position, suspect related to vasovagal syncope given history description, EKG within normal limit except mild sinus bradycardia, will obtain 72hrs Holter to exclude arrhythmia and repeat Echo to reassess structural heart function. Educated about counter-pressure maneuvers and also laying down position to avoid recurrent syncope.    Follow up as needed if the above tests are normal.  [EKG obtained to assist in diagnosis and management of assessed problem(s)] : EKG obtained to assist in diagnosis and management of assessed problem(s)

## 2024-08-08 NOTE — PLAN
[FreeTextEntry1] : Patient sent to ER to r/o peritonsillar abscess given sore throat, lymphadenopathy, muffled voice, pain on neck extension, and pus draining into mouth. Discussed that peritonsillar abscess must be diagnosed and treated immediately with drainage and IV antibiotics due to the risks associated with peritonsillar abscess. Patient agreeable to ER transfer. No drooling, respiratory distress, or signs of impending respiratory distress. Patient declined EMS transportation and will drive straight to hospital. Report given to ER HEIDI Charlton at [facility], patient's preferred hospital. Will have patient f/u after discharge.

## 2024-08-08 NOTE — PHYSICAL EXAM
[No Acute Distress] : no acute distress [Well Nourished] : well nourished [Well Developed] : well developed [Well-Appearing] : well-appearing [Normal Sclera/Conjunctiva] : normal sclera/conjunctiva [PERRL] : pupils equal round and reactive to light [EOMI] : extraocular movements intact [Normal Outer Ear/Nose] : the outer ears and nose were normal in appearance [Normal Oropharynx] : the oropharynx was normal [No JVD] : no jugular venous distention [No Lymphadenopathy] : no lymphadenopathy [Supple] : supple [Thyroid Normal, No Nodules] : the thyroid was normal and there were no nodules present [No Respiratory Distress] : no respiratory distress  [No Accessory Muscle Use] : no accessory muscle use [Clear to Auscultation] : lungs were clear to auscultation bilaterally [Normal Rate] : normal rate  [Regular Rhythm] : with a regular rhythm [Normal S1, S2] : normal S1 and S2 [No Murmur] : no murmur heard [No Carotid Bruits] : no carotid bruits [No Abdominal Bruit] : a ~M bruit was not heard ~T in the abdomen [No Varicosities] : no varicosities [Pedal Pulses Present] : the pedal pulses are present [No Edema] : there was no peripheral edema [No Palpable Aorta] : no palpable aorta [No Extremity Clubbing/Cyanosis] : no extremity clubbing/cyanosis [Soft] : abdomen soft [Non Tender] : non-tender [Non-distended] : non-distended [No Masses] : no abdominal mass palpated [No HSM] : no HSM [Normal Bowel Sounds] : normal bowel sounds [Normal Posterior Cervical Nodes] : no posterior cervical lymphadenopathy [No CVA Tenderness] : no CVA  tenderness [No Spinal Tenderness] : no spinal tenderness [No Joint Swelling] : no joint swelling [Grossly Normal Strength/Tone] : grossly normal strength/tone [No Rash] : no rash [Coordination Grossly Intact] : coordination grossly intact [No Focal Deficits] : no focal deficits [Normal Gait] : normal gait [Deep Tendon Reflexes (DTR)] : deep tendon reflexes were 2+ and symmetric [Normal Affect] : the affect was normal [Normal Insight/Judgement] : insight and judgment were intact [de-identified] : No tonsillar edema or exudates noted [de-identified] : Tenderness to gentle touch of right side of neck [de-identified] : anterior cervical lymphadenopathy

## 2024-08-08 NOTE — PHYSICAL EXAM
[Well Developed] : well developed [Well Nourished] : well nourished [No Acute Distress] : no acute distress [Normal Conjunctiva] : normal conjunctiva [Normal Venous Pressure] : normal venous pressure [No Carotid Bruit] : no carotid bruit [Normal S1, S2] : normal S1, S2 [No Murmur] : no murmur [No Rub] : no rub [Clear Lung Fields] : clear lung fields [No Gallop] : no gallop [Good Air Entry] : good air entry [No Respiratory Distress] : no respiratory distress  [Soft] : abdomen soft [No Masses/organomegaly] : no masses/organomegaly [Non Tender] : non-tender [Normal Bowel Sounds] : normal bowel sounds [Normal Gait] : normal gait [No Edema] : no edema [No Cyanosis] : no cyanosis [No Clubbing] : no clubbing [No Varicosities] : no varicosities [No Rash] : no rash [No Skin Lesions] : no skin lesions [Moves all extremities] : moves all extremities [No Focal Deficits] : no focal deficits [Normal Speech] : normal speech [Alert and Oriented] : alert and oriented [Normal memory] : normal memory

## 2024-08-08 NOTE — CARDIOLOGY SUMMARY
[de-identified] : 8/8/24- Sinus 51, normal axis, low voltage, no ST changes, QTc 408 [de-identified] : 8/6/21- LV EF 59%, normal biV function, no significant valvular abnormality

## 2024-08-08 NOTE — PHYSICAL EXAM
[No Acute Distress] : no acute distress [Well Nourished] : well nourished [Well Developed] : well developed [Well-Appearing] : well-appearing [Normal Sclera/Conjunctiva] : normal sclera/conjunctiva [PERRL] : pupils equal round and reactive to light [EOMI] : extraocular movements intact [Normal Outer Ear/Nose] : the outer ears and nose were normal in appearance [Normal Oropharynx] : the oropharynx was normal [No JVD] : no jugular venous distention [No Lymphadenopathy] : no lymphadenopathy [Supple] : supple [Thyroid Normal, No Nodules] : the thyroid was normal and there were no nodules present [No Respiratory Distress] : no respiratory distress  [No Accessory Muscle Use] : no accessory muscle use [Clear to Auscultation] : lungs were clear to auscultation bilaterally [Normal Rate] : normal rate  [Regular Rhythm] : with a regular rhythm [Normal S1, S2] : normal S1 and S2 [No Murmur] : no murmur heard [No Carotid Bruits] : no carotid bruits [No Abdominal Bruit] : a ~M bruit was not heard ~T in the abdomen [No Varicosities] : no varicosities [Pedal Pulses Present] : the pedal pulses are present [No Edema] : there was no peripheral edema [No Palpable Aorta] : no palpable aorta [No Extremity Clubbing/Cyanosis] : no extremity clubbing/cyanosis [Soft] : abdomen soft [Non Tender] : non-tender [Non-distended] : non-distended [No Masses] : no abdominal mass palpated [No HSM] : no HSM [Normal Bowel Sounds] : normal bowel sounds [Normal Posterior Cervical Nodes] : no posterior cervical lymphadenopathy [No CVA Tenderness] : no CVA  tenderness [No Spinal Tenderness] : no spinal tenderness [No Joint Swelling] : no joint swelling [Grossly Normal Strength/Tone] : grossly normal strength/tone [No Rash] : no rash [Coordination Grossly Intact] : coordination grossly intact [No Focal Deficits] : no focal deficits [Normal Gait] : normal gait [Deep Tendon Reflexes (DTR)] : deep tendon reflexes were 2+ and symmetric [Normal Affect] : the affect was normal [Normal Insight/Judgement] : insight and judgment were intact [de-identified] : No tonsillar edema or exudates noted [de-identified] : Tenderness to gentle touch of right side of neck [de-identified] : anterior cervical lymphadenopathy

## 2024-08-08 NOTE — CARDIOLOGY SUMMARY
[de-identified] : 8/8/24- Sinus 51, normal axis, low voltage, no ST changes, QTc 408 [de-identified] : 8/6/21- LV EF 59%, normal biV function, no significant valvular abnormality

## 2024-08-08 NOTE — HISTORY OF PRESENT ILLNESS
[FreeTextEntry1] : 34F h/o anxiety/depression, TIMA, obesity (s/p bariatric gastric sleeve surgery 2021 BMI 43--> 22), asthma, former smoker, chronic pain (on medical marijuana), had recent syncope while standing with alcohol drink, reportedly loss consciousness for 3-5 minutes witnessed by her friend, had prior syncope x2 also during Summer weather, seen by PCP recently, refer for cardiology evaluation.   She recollects felt a hot sensation prior to passing out, denies chest pain or palpitations. Has been hydrating herself well since episode. Has been falling asleep easily recently, sleep study done recently told normal. Used to workout and exercise playing volleyball.  Had prior cardiac evaluation with normal Echo done in 2021 prior to bariatric surgery.   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

## 2024-08-08 NOTE — REVIEW OF SYSTEMS
[Fever] : fever [Chills] : chills [Fatigue] : fatigue [Hot Flashes] : no hot flashes [Night Sweats] : no night sweats [Recent Change In Weight] : ~T no recent weight change [Earache] : no earache [Hearing Loss] : no hearing loss [Nosebleed] : no nosebleeds [Hoarseness] : hoarseness [Nasal Discharge] : no nasal discharge [Sore Throat] : sore throat [Postnasal Drip] : no postnasal drip [Negative] : Heme/Lymph

## 2024-08-08 NOTE — HISTORY OF PRESENT ILLNESS
[FreeTextEntry8] : Patient is a 34-year-old female presenting for sore throat. 2 weeks ago, patient had sore throat, fever, body aches. She went to  and was swabbed for flu, strep, and covid, all negative. 3 days later, she was still having symptoms, so she went back to  and she was reswabbed, all still negative. She was given amoxicillin x 10 days to take 3 days later if symptoms did not improve. Today is day 10 of amoxicillin, but she doesn't feel any better. She reports a severe sore throat, spitting up yellow mucous (not coughing it up and not from PND). She endorses right-sided neck pain, worse on neck extension. She also endorses swollen glands. No more fevers. Feels labored breathing in the evening, albuterol inhaler helps. No drooling. Patient endorses hoarseness and muffled voice in the evening. + dysphagia.

## 2024-08-08 NOTE — CARDIOLOGY SUMMARY
[de-identified] : 8/8/24- Sinus 51, normal axis, low voltage, no ST changes, QTc 408 [de-identified] : 8/6/21- LV EF 59%, normal biV function, no significant valvular abnormality

## 2024-08-08 NOTE — PLAN
[FreeTextEntry1] : Patient sent to ER to r/o peritonsillar abscess given sore throat, lymphadenopathy, muffled voice, pain on neck extension, and pus draining into mouth. Discussed that peritonsillar abscess must be diagnosed and treated immediately with drainage and IV antibiotics due to the risks associated with peritonsillar abscess. Patient agreeable to ER transfer. No drooling, respiratory distress, or signs of impending respiratory distress. Patient declined EMS transportation and will drive straight to hospital. Report given to ER HEIDI Charlton at [facility], patient's preferred hospital. Will have patient f/u after discharge.

## 2024-08-09 ENCOUNTER — TRANSCRIPTION ENCOUNTER (OUTPATIENT)
Age: 34
End: 2024-08-09

## 2024-08-18 ENCOUNTER — NON-APPOINTMENT (OUTPATIENT)
Age: 34
End: 2024-08-18

## 2024-08-21 ENCOUNTER — APPOINTMENT (OUTPATIENT)
Dept: CARDIOLOGY | Facility: CLINIC | Age: 34
End: 2024-08-21
Payer: COMMERCIAL

## 2024-08-21 PROCEDURE — 93306 TTE W/DOPPLER COMPLETE: CPT

## 2025-02-06 NOTE — ASU DISCHARGE PLAN (ADULT/PEDIATRIC) - FOR NEXT 24 HOURS DO NOT:
Statement Selected c/w home meds   dash diet c/w home meds   dash diet c/w home meds   dash diet c/w home meds   dash diet c/w home meds   dash diet

## 2025-02-15 NOTE — ED PROCEDURE NOTE - CPROC ED POST PROC CARE GUIDE1
Verbal/written post procedure instructions were given to patient/caregiver./Instructed patient/caregiver to follow-up with primary care physician./Instructed patient/caregiver regarding signs and symptoms of infection./Keep the cast/splint/dressing clean and dry.
PAST MEDICAL HISTORY:  Anemia

## 2025-02-18 ENCOUNTER — RX RENEWAL (OUTPATIENT)
Age: 35
End: 2025-02-18

## 2025-03-06 NOTE — ED ADULT TRIAGE NOTE - WEIGHT IN LBS
Assessment/Plan   Diagnoses and all orders for this visit:  Dry eyes  -Start artificial tears both eyes (OU) qid  -stress compliance    Posterior capsular opacification of both eyes, not obscuring vision  Not visually significant at the present time  -advised pt if she notices blurry vision or decreased vision to give  us a call and we can discuss Yag capsulotomy if she is having problems  continue to monitor    Pseudophakia  continue to monitor    Squamous blepharitis of upper and lower eyelids of both eyes  Start warm compresses both eyes 2 times a day for 30 minutes    Return for a dilated exam in  6   months or sooner if having any problems       240